# Patient Record
Sex: MALE | Race: WHITE | Employment: FULL TIME | URBAN - METROPOLITAN AREA
[De-identification: names, ages, dates, MRNs, and addresses within clinical notes are randomized per-mention and may not be internally consistent; named-entity substitution may affect disease eponyms.]

---

## 2017-04-17 ENCOUNTER — OFFICE VISIT (OUTPATIENT)
Dept: FAMILY MEDICINE CLINIC | Facility: CLINIC | Age: 42
End: 2017-04-17

## 2017-04-17 VITALS
DIASTOLIC BLOOD PRESSURE: 94 MMHG | HEART RATE: 88 BPM | HEIGHT: 70 IN | TEMPERATURE: 98 F | WEIGHT: 218 LBS | BODY MASS INDEX: 31.21 KG/M2 | RESPIRATION RATE: 18 BRPM | OXYGEN SATURATION: 97 % | SYSTOLIC BLOOD PRESSURE: 144 MMHG

## 2017-04-17 DIAGNOSIS — Z00.00 LABORATORY EXAMINATION ORDERED AS PART OF A COMPLETE PHYSICAL EXAMINATION: ICD-10-CM

## 2017-04-17 DIAGNOSIS — F32.A DEPRESSION, UNSPECIFIED DEPRESSION TYPE: ICD-10-CM

## 2017-04-17 DIAGNOSIS — L30.9 ECZEMA, UNSPECIFIED TYPE: Primary | ICD-10-CM

## 2017-04-17 DIAGNOSIS — I10 ESSENTIAL HYPERTENSION: ICD-10-CM

## 2017-04-17 DIAGNOSIS — G47.30 SLEEP APNEA, UNSPECIFIED TYPE: ICD-10-CM

## 2017-04-17 PROCEDURE — 99214 OFFICE O/P EST MOD 30 MIN: CPT | Performed by: FAMILY MEDICINE

## 2017-04-17 RX ORDER — LOSARTAN POTASSIUM 50 MG/1
50 TABLET ORAL DAILY
Qty: 30 TABLET | Refills: 1 | Status: SHIPPED | OUTPATIENT
Start: 2017-04-17 | End: 2017-06-15

## 2017-04-17 RX ORDER — CLOBETASOL PROPIONATE 0.5 MG/ML
LOTION TOPICAL
Qty: 59 G | Refills: 1 | Status: SHIPPED | OUTPATIENT
Start: 2017-04-17 | End: 2017-07-17

## 2017-04-17 RX ORDER — ALPRAZOLAM 0.25 MG/1
0.25 TABLET ORAL 2 TIMES DAILY PRN
Qty: 30 TABLET | Refills: 1 | Status: SHIPPED | OUTPATIENT
Start: 2017-04-17 | End: 2018-04-16

## 2017-04-17 RX ORDER — LOSARTAN POTASSIUM 50 MG/1
TABLET ORAL
Qty: 90 TABLET | Refills: 1 | OUTPATIENT
Start: 2017-04-17

## 2017-04-17 RX ORDER — BUPROPION HYDROCHLORIDE 150 MG/1
150 TABLET ORAL DAILY
Qty: 30 TABLET | Refills: 1 | Status: SHIPPED | OUTPATIENT
Start: 2017-04-17 | End: 2017-06-15

## 2017-04-17 NOTE — PROGRESS NOTES
HPI:    Patient ID: Vish Marc is a 42year old male. HPI Comments: Depression chronic. No suicidal thoughts. Intermittently. Not on antidepressant medication for quit some time. smoking reguarly. Does want to quit. Change in sleep.   Mild anhedon Outpatient Prescriptions:  BuPROPion HCl ER, XL, 150 MG Oral Tablet 24 Hr Take 1 tablet (150 mg total) by mouth daily. Disp: 30 tablet Rfl: 1   ALPRAZolam 0.25 MG Oral Tab Take 1 tablet (0.25 mg total) by mouth 2 (two) times daily as needed for Anxiety.  Wili Tucker physical    1. Sleep apnea, unspecified type  - Diagnostic Sleep Study-split night PAP implemented if criteria met    2. Depression, unspecified depression type  - BuPROPion HCl ER, XL, 150 MG Oral Tablet 24 Hr; Take 1 tablet (150 mg total) by mouth daily.

## 2017-04-22 ENCOUNTER — LAB ENCOUNTER (OUTPATIENT)
Dept: LAB | Age: 42
End: 2017-04-22
Attending: FAMILY MEDICINE
Payer: COMMERCIAL

## 2017-04-22 DIAGNOSIS — Z00.00 LABORATORY EXAMINATION ORDERED AS PART OF A COMPLETE PHYSICAL EXAMINATION: ICD-10-CM

## 2017-04-22 PROCEDURE — 82306 VITAMIN D 25 HYDROXY: CPT

## 2017-04-22 PROCEDURE — 80061 LIPID PANEL: CPT

## 2017-04-22 PROCEDURE — 80053 COMPREHEN METABOLIC PANEL: CPT

## 2017-04-22 PROCEDURE — 84443 ASSAY THYROID STIM HORMONE: CPT

## 2017-04-22 PROCEDURE — 36415 COLL VENOUS BLD VENIPUNCTURE: CPT

## 2017-04-22 PROCEDURE — 85025 COMPLETE CBC W/AUTO DIFF WBC: CPT

## 2017-04-22 PROCEDURE — 83721 ASSAY OF BLOOD LIPOPROTEIN: CPT

## 2017-04-28 ENCOUNTER — OFFICE VISIT (OUTPATIENT)
Dept: FAMILY MEDICINE CLINIC | Facility: CLINIC | Age: 42
End: 2017-04-28

## 2017-04-28 VITALS
HEIGHT: 70 IN | WEIGHT: 218 LBS | BODY MASS INDEX: 31.21 KG/M2 | OXYGEN SATURATION: 97 % | SYSTOLIC BLOOD PRESSURE: 118 MMHG | DIASTOLIC BLOOD PRESSURE: 78 MMHG | RESPIRATION RATE: 18 BRPM | HEART RATE: 96 BPM

## 2017-04-28 DIAGNOSIS — F10.10 ALCOHOL ABUSE: ICD-10-CM

## 2017-04-28 DIAGNOSIS — Z00.00 ANNUAL PHYSICAL EXAM: Primary | ICD-10-CM

## 2017-04-28 DIAGNOSIS — R73.01 ELEVATED FASTING GLUCOSE: ICD-10-CM

## 2017-04-28 DIAGNOSIS — R74.8 LIVER ENZYME ELEVATION: ICD-10-CM

## 2017-04-28 DIAGNOSIS — F17.200 SMOKING: ICD-10-CM

## 2017-04-28 DIAGNOSIS — E55.9 VITAMIN D DEFICIENCY: ICD-10-CM

## 2017-04-28 DIAGNOSIS — R79.89 ABNORMAL CBC: ICD-10-CM

## 2017-04-28 DIAGNOSIS — E66.09 OBESITY DUE TO EXCESS CALORIES, UNSPECIFIED OBESITY SEVERITY: ICD-10-CM

## 2017-04-28 PROCEDURE — 99396 PREV VISIT EST AGE 40-64: CPT | Performed by: FAMILY MEDICINE

## 2017-04-28 RX ORDER — ERGOCALCIFEROL 1.25 MG/1
50000 CAPSULE ORAL WEEKLY
Qty: 4 CAPSULE | Refills: 2 | Status: SHIPPED | OUTPATIENT
Start: 2017-04-28 | End: 2018-04-16 | Stop reason: ALTCHOICE

## 2017-04-28 NOTE — H&P
Michael Marc is a 36year old male who presents for a complete physical exam.   HPI:   Pt complains of smoking, alcohol, obesity, HTN. Review of blood test showing elevated liver enzymes, elevated glucose, and vit D def. No abd pain, no N/V. No F/C.   No 6.0 oz/week       12 Standard drinks or equivalent per week       Comment: social        REVIEW OF SYSTEMS:   GENERAL: feels well otherwise  SKIN: denies any unusual skin lesions  EYES:denies blurred vision or double vision  HEENT: denies nasal congestion, issues and agrees to the plan. Get blood test in 2 weeks. Diet, exercise, weight loss, quit EtOH, quit Smoking  Get lipid panel in 2-3 months. 1. Annual physical exam  Reviewed labs with pt.    2. Liver enzyme elevation  Stop etoh, loose weight.   -

## 2017-05-01 ENCOUNTER — LAB ENCOUNTER (OUTPATIENT)
Dept: LAB | Age: 42
End: 2017-05-01
Attending: FAMILY MEDICINE
Payer: COMMERCIAL

## 2017-05-01 ENCOUNTER — OFFICE VISIT (OUTPATIENT)
Dept: FAMILY MEDICINE CLINIC | Facility: CLINIC | Age: 42
End: 2017-05-01

## 2017-05-01 VITALS
DIASTOLIC BLOOD PRESSURE: 80 MMHG | HEIGHT: 70 IN | HEART RATE: 94 BPM | BODY MASS INDEX: 30.49 KG/M2 | SYSTOLIC BLOOD PRESSURE: 126 MMHG | WEIGHT: 213 LBS | RESPIRATION RATE: 16 BRPM | TEMPERATURE: 99 F

## 2017-05-01 DIAGNOSIS — R10.32 LEFT LOWER QUADRANT PAIN: ICD-10-CM

## 2017-05-01 DIAGNOSIS — R79.89 ELEVATED LIVER FUNCTION TESTS: ICD-10-CM

## 2017-05-01 DIAGNOSIS — R10.32 LEFT LOWER QUADRANT PAIN: Primary | ICD-10-CM

## 2017-05-01 DIAGNOSIS — R10.9 FLANK PAIN: ICD-10-CM

## 2017-05-01 PROCEDURE — 99213 OFFICE O/P EST LOW 20 MIN: CPT | Performed by: FAMILY MEDICINE

## 2017-05-01 PROCEDURE — 80053 COMPREHEN METABOLIC PANEL: CPT

## 2017-05-01 PROCEDURE — 87086 URINE CULTURE/COLONY COUNT: CPT | Performed by: FAMILY MEDICINE

## 2017-05-01 PROCEDURE — 36415 COLL VENOUS BLD VENIPUNCTURE: CPT

## 2017-05-01 PROCEDURE — 81003 URINALYSIS AUTO W/O SCOPE: CPT | Performed by: FAMILY MEDICINE

## 2017-05-01 PROCEDURE — 85025 COMPLETE CBC W/AUTO DIFF WBC: CPT

## 2017-05-01 NOTE — PROGRESS NOTES
This is a patient of Dr. Sarai Noguera who just had a physical last week on Friday and laboratories approximately a week before. Presents with lower back pain that seemed to radiate around into the lower abdomen.   Yesterday alternated Tylenol and Motrin to help c Relation Age of Onset   • High Cholesterol Father    • Diabetes Father    • High Blood Pressure Mother    • Diabetes Paternal Grandmother    • Diabetes Paternal Grandfather    • Diabetes Sister    • ADHD Daughter        PHYSICAL EXAM:  /80 mmHg  Puls

## 2017-05-02 RX ORDER — LEVOFLOXACIN 500 MG/1
500 TABLET, FILM COATED ORAL DAILY
Qty: 7 TABLET | Refills: 0 | Status: SHIPPED | OUTPATIENT
Start: 2017-05-02 | End: 2017-05-09

## 2017-05-04 ENCOUNTER — OFFICE VISIT (OUTPATIENT)
Dept: SLEEP CENTER | Facility: HOSPITAL | Age: 42
End: 2017-05-04
Attending: FAMILY MEDICINE
Payer: COMMERCIAL

## 2017-05-04 PROCEDURE — 95810 POLYSOM 6/> YRS 4/> PARAM: CPT

## 2017-05-31 NOTE — PROGRESS NOTES
Quick Note:    Pt will be notified by CPAP coordinator and will f/u with Dr. Oswaldo Ovalle. Please explain that the delay in interpreting this study was due to study having been assigned to another non-[facility] physician.  I interpreted the study as the  b/c

## 2017-05-31 NOTE — PROCEDURES
[address] [address],[facility] [address]       Accredited by the [facility] of [facility] (AASM)    PATIENT'S NAME:        Aditi Jack  ATTENDING PHYSICIAN:   Yanick Talley M.D.   REFERRING PHYSICIAN:   Gerry Nava MD  PATIENT ACC strong positional component with a supine AHI of 64 and a lateral AHI of 6. Patient did have oxyhemoglobin desaturations with an oxygen saturation vivek of 81%; however, he spent less than 1% of sleep time with oxygen levels below 90%.     PERIODIC LIMB MO

## 2017-06-01 ENCOUNTER — TELEPHONE (OUTPATIENT)
Dept: FAMILY MEDICINE CLINIC | Facility: CLINIC | Age: 42
End: 2017-06-01

## 2017-06-01 DIAGNOSIS — G47.30 SLEEP APNEA, UNSPECIFIED TYPE: Primary | ICD-10-CM

## 2017-06-02 NOTE — TELEPHONE ENCOUNTER
+ sleep apnea. Very positional.  Recommendation is for CPAP machine. I ordered CPAP titration study, please have him go get it. Based on titration study we will send out for CPAP machine.

## 2017-06-15 ENCOUNTER — OFFICE VISIT (OUTPATIENT)
Dept: SLEEP CENTER | Facility: HOSPITAL | Age: 42
End: 2017-06-15
Attending: FAMILY MEDICINE
Payer: COMMERCIAL

## 2017-06-15 PROCEDURE — 95811 POLYSOM 6/>YRS CPAP 4/> PARM: CPT

## 2017-06-15 RX ORDER — LOSARTAN POTASSIUM 50 MG/1
TABLET ORAL
Qty: 90 TABLET | Refills: 1 | Status: SHIPPED | OUTPATIENT
Start: 2017-06-15 | End: 2018-04-16

## 2017-06-16 RX ORDER — BUPROPION HYDROCHLORIDE 150 MG/1
TABLET ORAL
Qty: 90 TABLET | Refills: 0 | Status: SHIPPED | OUTPATIENT
Start: 2017-06-16 | End: 2018-04-16

## 2017-06-21 NOTE — PROCEDURES
[address] [address],[facility] [address]       Accredited by the [facility] of [facility] Medicine (AASM)    PATIENT'S NAME:        Isaias Beckett  ATTENDING PHYSICIAN:   Marnie Butts M.D.   REFERRING PHYSICIAN:   Johnathan Colbert MD  PATIENT ACC stage REM 24%. RESPIRATORY MEASURES:  The patient was initiated on CPAP at 5 cm of water and titrated up to a final pressure of 8 cm of water.   On this setting, patient was able to achieve prolonged periods of stage REM sleep in the lateral position; ho

## 2017-06-21 NOTE — PROGRESS NOTES
Quick Note:    Pt will be notified by CPAP coordinator and pt will f/u with referring physician  ______

## 2017-06-22 ENCOUNTER — TELEPHONE (OUTPATIENT)
Dept: FAMILY MEDICINE CLINIC | Facility: CLINIC | Age: 42
End: 2017-06-22

## 2017-06-22 DIAGNOSIS — G47.33 OBSTRUCTIVE SLEEP APNEA (ADULT) (PEDIATRIC): Primary | ICD-10-CM

## 2017-06-22 NOTE — TELEPHONE ENCOUNTER
Patient had sleep studies done, need DME for cpap machine and supplies, needs referral for this.        Outgoing/external  [facility]  Diagnosis:  G47.33  1 visit      In notes:       CPAP DEVICE    HEATED HUMIDIFIER    FULL FACE MASK

## 2017-06-27 ENCOUNTER — TELEPHONE (OUTPATIENT)
Dept: FAMILY MEDICINE CLINIC | Facility: CLINIC | Age: 42
End: 2017-06-27

## 2017-06-27 NOTE — TELEPHONE ENCOUNTER
----- Message from Niurka Keyes DO sent at 6/23/2017  3:38 PM CDT -----  Patient should be initiated on CPAP at 8 cm of water pressure with EPR level 1, humidity level 4.  During this study, patient used a ResMed AirFit P-10 size small mask.   2.      Tasha

## 2017-08-18 ENCOUNTER — APPOINTMENT (OUTPATIENT)
Dept: LAB | Age: 42
End: 2017-08-18
Attending: DERMATOLOGY
Payer: COMMERCIAL

## 2017-08-18 DIAGNOSIS — D23.9 DERMATOFIBROMA: ICD-10-CM

## 2017-08-18 DIAGNOSIS — R52 TENDERNESS: ICD-10-CM

## 2017-08-18 PROCEDURE — 88305 TISSUE EXAM BY PATHOLOGIST: CPT

## 2018-03-08 RX ORDER — ACYCLOVIR 400 MG/1
400 TABLET ORAL
Qty: 50 TABLET | Refills: 0 | OUTPATIENT
Start: 2018-03-08

## 2018-03-08 NOTE — TELEPHONE ENCOUNTER
I saw him once for a rash on abdomen. Sees Dr. Jac Soriano. May need more info on why this is being refilled.

## 2018-04-02 ENCOUNTER — TELEPHONE (OUTPATIENT)
Dept: FAMILY MEDICINE CLINIC | Facility: CLINIC | Age: 43
End: 2018-04-02

## 2018-04-02 DIAGNOSIS — G47.33 OSA (OBSTRUCTIVE SLEEP APNEA): Primary | ICD-10-CM

## 2018-04-06 ENCOUNTER — LAB ENCOUNTER (OUTPATIENT)
Dept: LAB | Age: 43
End: 2018-04-06
Attending: DERMATOLOGY
Payer: COMMERCIAL

## 2018-04-06 DIAGNOSIS — C44.619 BCC (BASAL CELL CARCINOMA), ARM, LEFT: ICD-10-CM

## 2018-04-06 PROCEDURE — 88305 TISSUE EXAM BY PATHOLOGIST: CPT

## 2018-04-09 RX ORDER — ACYCLOVIR 400 MG/1
400 TABLET ORAL
Refills: 0 | OUTPATIENT
Start: 2018-04-09

## 2018-04-16 ENCOUNTER — OFFICE VISIT (OUTPATIENT)
Dept: FAMILY MEDICINE CLINIC | Facility: CLINIC | Age: 43
End: 2018-04-16

## 2018-04-16 VITALS
SYSTOLIC BLOOD PRESSURE: 132 MMHG | HEART RATE: 76 BPM | BODY MASS INDEX: 30.78 KG/M2 | DIASTOLIC BLOOD PRESSURE: 84 MMHG | OXYGEN SATURATION: 98 % | WEIGHT: 215 LBS | RESPIRATION RATE: 18 BRPM | TEMPERATURE: 98 F | HEIGHT: 70 IN

## 2018-04-16 DIAGNOSIS — F41.9 ANXIETY: ICD-10-CM

## 2018-04-16 DIAGNOSIS — Z00.00 LABORATORY EXAMINATION ORDERED AS PART OF A COMPLETE PHYSICAL EXAMINATION: ICD-10-CM

## 2018-04-16 DIAGNOSIS — J41.0 SMOKERS' COUGH (HCC): ICD-10-CM

## 2018-04-16 DIAGNOSIS — A60.01 HERPES SIMPLEX INFECTION OF PENIS: ICD-10-CM

## 2018-04-16 DIAGNOSIS — Z71.6 ENCOUNTER FOR SMOKING CESSATION COUNSELING: ICD-10-CM

## 2018-04-16 DIAGNOSIS — I10 ESSENTIAL HYPERTENSION: Primary | ICD-10-CM

## 2018-04-16 PROCEDURE — 99214 OFFICE O/P EST MOD 30 MIN: CPT | Performed by: FAMILY MEDICINE

## 2018-04-16 PROCEDURE — 99406 BEHAV CHNG SMOKING 3-10 MIN: CPT | Performed by: FAMILY MEDICINE

## 2018-04-16 RX ORDER — ALPRAZOLAM 0.25 MG/1
0.25 TABLET ORAL 2 TIMES DAILY PRN
Qty: 30 TABLET | Refills: 1 | Status: SHIPPED | OUTPATIENT
Start: 2018-04-16 | End: 2020-07-24

## 2018-04-16 RX ORDER — VARENICLINE TARTRATE 1 MG/1
1 TABLET, FILM COATED ORAL 2 TIMES DAILY
Qty: 180 TABLET | Refills: 0 | Status: SHIPPED | OUTPATIENT
Start: 2018-04-16 | End: 2018-11-12

## 2018-04-16 RX ORDER — VALACYCLOVIR HYDROCHLORIDE 1 G/1
1 TABLET, FILM COATED ORAL DAILY
Qty: 90 TABLET | Refills: 3 | Status: SHIPPED | OUTPATIENT
Start: 2018-04-16

## 2018-04-16 RX ORDER — VARENICLINE TARTRATE 0.5 (11)-1
KIT ORAL
Refills: 0 | COMMUNITY
Start: 2018-03-28 | End: 2020-06-15

## 2018-04-16 RX ORDER — BUPROPION HYDROCHLORIDE 150 MG/1
TABLET ORAL
Qty: 90 TABLET | Refills: 1 | Status: ON HOLD | OUTPATIENT
Start: 2018-04-16 | End: 2020-12-20

## 2018-04-16 RX ORDER — LOSARTAN POTASSIUM 50 MG/1
TABLET ORAL
Qty: 90 TABLET | Refills: 1 | Status: SHIPPED | OUTPATIENT
Start: 2018-04-16 | End: 2020-06-15

## 2018-04-16 NOTE — PROGRESS NOTES
HPI:    Patient ID: Gabriela Marc is a 36year old male. Chronic smoker. Has decreased and wants to quit. Previously quit but only for a short time. Wants to continue chantix. Thinks that wellbutrin helped also. + smoker's cough.   States cough stoppe as needed for Anxiety. Disp: 30 tablet Rfl: 1   ValACYclovir HCl 1 G Oral Tab Take 1 tablet (1,000 mg total) by mouth daily. Disp: 90 tablet Rfl: 3   Clobetasol Propionate 0.05 % External Ointment Apply 1 Application topically 2 (two) times daily.  Disp: 60 ALPRAZolam 0.25 MG Oral Tab; Take 1 tablet (0.25 mg total) by mouth 2 (two) times daily as needed for Anxiety. Dispense: 30 tablet; Refill: 1    4. Smokers' cough ([address]  Quit smoking    5.  Encounter for smoking cessation counseling  Discussed risk of smok

## 2018-05-03 NOTE — TELEPHONE ENCOUNTER
Entered Referral.  Prepared fax for YP to review/sign when back in office with attached Sleep Study/Titration Study notes with Referral printed.

## 2018-05-14 ENCOUNTER — LAB ENCOUNTER (OUTPATIENT)
Dept: LAB | Age: 43
End: 2018-05-14
Attending: FAMILY MEDICINE
Payer: COMMERCIAL

## 2018-05-14 DIAGNOSIS — I10 ESSENTIAL HYPERTENSION: ICD-10-CM

## 2018-05-14 DIAGNOSIS — Z00.00 LABORATORY EXAMINATION ORDERED AS PART OF A COMPLETE PHYSICAL EXAMINATION: ICD-10-CM

## 2018-05-14 PROCEDURE — 36415 COLL VENOUS BLD VENIPUNCTURE: CPT

## 2018-05-14 PROCEDURE — 80061 LIPID PANEL: CPT

## 2018-05-14 PROCEDURE — 80053 COMPREHEN METABOLIC PANEL: CPT

## 2018-05-14 PROCEDURE — 82306 VITAMIN D 25 HYDROXY: CPT

## 2018-05-14 PROCEDURE — 85025 COMPLETE CBC W/AUTO DIFF WBC: CPT

## 2018-05-21 ENCOUNTER — OFFICE VISIT (OUTPATIENT)
Dept: FAMILY MEDICINE CLINIC | Facility: CLINIC | Age: 43
End: 2018-05-21

## 2018-05-21 VITALS
DIASTOLIC BLOOD PRESSURE: 86 MMHG | HEIGHT: 70 IN | BODY MASS INDEX: 30.06 KG/M2 | RESPIRATION RATE: 18 BRPM | TEMPERATURE: 98 F | SYSTOLIC BLOOD PRESSURE: 118 MMHG | HEART RATE: 75 BPM | WEIGHT: 210 LBS | OXYGEN SATURATION: 96 %

## 2018-05-21 DIAGNOSIS — Z00.00 ANNUAL PHYSICAL EXAM: Primary | ICD-10-CM

## 2018-05-21 DIAGNOSIS — D58.2 ABNORMAL HEMOGLOBIN (HCC): ICD-10-CM

## 2018-05-21 DIAGNOSIS — E78.2 MIXED HYPERLIPIDEMIA: ICD-10-CM

## 2018-05-21 DIAGNOSIS — E01.0 THYROMEGALY: ICD-10-CM

## 2018-05-21 DIAGNOSIS — I10 ESSENTIAL HYPERTENSION: ICD-10-CM

## 2018-05-21 PROCEDURE — 99396 PREV VISIT EST AGE 40-64: CPT | Performed by: FAMILY MEDICINE

## 2018-05-21 PROCEDURE — 99212 OFFICE O/P EST SF 10 MIN: CPT | Performed by: FAMILY MEDICINE

## 2018-05-21 NOTE — H&P
Walker Marc is a 39year old male who presents for a complete physical exam.   HPI:   Pt's lab results show abnormal hemoglobin levels. States he has a varied diet. Denied CP/SOB/HAs. Not anemic. Hyperlipidemia   This is a chronic problem.  The chioma 1 TABLET(50 MG) BY MOUTH DAILY Disp: 90 tablet Rfl: 1   ALPRAZolam 0.25 MG Oral Tab Take 1 tablet (0.25 mg total) by mouth 2 (two) times daily as needed for Anxiety.  Disp: 30 tablet Rfl: 1   ValACYclovir HCl 1 G Oral Tab Take 1 tablet (1,000 mg total) by m palpitations  GI: denies abdominal pain,denies heartburn, denies chronic diarrhea or constipation  : denies nocturia or changes in stream, denies erectile dysfunction  MS: denies back pain, arthralgias or myalgias  NEURO: denies headaches or dizziness  P future if loosing weight. 5. Thyromegaly  - US THYROID (PEM=[id number]);  Future        Orders Placed This Encounter      Lipid Panel      Vitamin H15 [E]      Folic Acid Serum [E]      CBC W Differential W Platelet [E]

## 2018-06-15 ENCOUNTER — TELEPHONE (OUTPATIENT)
Dept: FAMILY MEDICINE CLINIC | Facility: CLINIC | Age: 43
End: 2018-06-15

## 2018-06-15 ENCOUNTER — HOSPITAL ENCOUNTER (OUTPATIENT)
Dept: ULTRASOUND IMAGING | Age: 43
Discharge: HOME | End: 2018-06-15
Attending: FAMILY MEDICINE
Payer: COMMERCIAL

## 2018-06-15 DIAGNOSIS — E01.0 THYROMEGALY: ICD-10-CM

## 2018-06-15 PROCEDURE — 76536 US EXAM OF HEAD AND NECK: CPT | Performed by: FAMILY MEDICINE

## 2018-06-19 ENCOUNTER — TELEPHONE (OUTPATIENT)
Dept: FAMILY MEDICINE CLINIC | Facility: CLINIC | Age: 43
End: 2018-06-19

## 2018-10-10 ENCOUNTER — TELEPHONE (OUTPATIENT)
Dept: FAMILY MEDICINE CLINIC | Facility: CLINIC | Age: 43
End: 2018-10-10

## 2018-10-10 DIAGNOSIS — G47.33 OSA ON CPAP: Primary | ICD-10-CM

## 2018-10-10 DIAGNOSIS — Z99.89 OSA ON CPAP: Primary | ICD-10-CM

## 2018-10-31 ENCOUNTER — TELEPHONE (OUTPATIENT)
Dept: FAMILY MEDICINE CLINIC | Facility: CLINIC | Age: 43
End: 2018-10-31

## 2018-10-31 DIAGNOSIS — L40.0 PSORIASIS VULGARIS: Primary | ICD-10-CM

## 2018-10-31 NOTE — TELEPHONE ENCOUNTER
Fax received from [facility] requesting a referral for patient who has appointment in their office on Friday 11/2. Chart note faxed.     Placed in [facility] 5675 triage bin

## 2019-01-08 ENCOUNTER — TELEPHONE (OUTPATIENT)
Dept: FAMILY MEDICINE CLINIC | Facility: CLINIC | Age: 44
End: 2019-01-08

## 2019-01-08 DIAGNOSIS — Z99.89 OSA ON CPAP: Primary | ICD-10-CM

## 2019-01-08 DIAGNOSIS — G47.33 OSA ON CPAP: Primary | ICD-10-CM

## 2019-01-25 PROCEDURE — 88305 TISSUE EXAM BY PATHOLOGIST: CPT

## 2019-01-26 ENCOUNTER — LAB ENCOUNTER (OUTPATIENT)
Dept: LAB | Age: 44
End: 2019-01-26
Attending: DERMATOLOGY
Payer: COMMERCIAL

## 2019-01-26 DIAGNOSIS — C44.629 SCC (SQUAMOUS CELL CARCINOMA), ARM, LEFT: ICD-10-CM

## 2019-02-22 ENCOUNTER — LAB ENCOUNTER (OUTPATIENT)
Dept: LAB | Age: 44
End: 2019-02-22
Attending: FAMILY MEDICINE
Payer: COMMERCIAL

## 2019-02-22 DIAGNOSIS — D58.2 ABNORMAL HEMOGLOBIN (HCC): ICD-10-CM

## 2019-02-22 DIAGNOSIS — E78.2 MIXED HYPERLIPIDEMIA: ICD-10-CM

## 2019-02-22 LAB
BASOPHILS # BLD AUTO: 0.06 X10(3) UL (ref 0–0.2)
BASOPHILS NFR BLD AUTO: 0.9 %
CHOLEST SMN-MCNC: 282 MG/DL (ref ?–200)
DEPRECATED RDW RBC AUTO: 46.8 FL (ref 35.1–46.3)
EOSINOPHIL # BLD AUTO: 0.51 X10(3) UL (ref 0–0.7)
EOSINOPHIL NFR BLD AUTO: 7.2 %
ERYTHROCYTE [DISTWIDTH] IN BLOOD BY AUTOMATED COUNT: 12.1 % (ref 11–15)
FOLATE SERPL-MCNC: 7.5 NG/ML (ref 8.7–?)
HCT VFR BLD AUTO: 45.4 % (ref 39–53)
HDLC SERPL-MCNC: 45 MG/DL (ref 40–59)
HGB BLD-MCNC: 15.1 G/DL (ref 13–17.5)
IMM GRANULOCYTES # BLD AUTO: 0.03 X10(3) UL (ref 0–1)
IMM GRANULOCYTES NFR BLD: 0.4 %
LDLC SERPL CALC-MCNC: 172 MG/DL (ref ?–100)
LYMPHOCYTES # BLD AUTO: 1.9 X10(3) UL (ref 1–4)
LYMPHOCYTES NFR BLD AUTO: 27 %
MCH RBC QN AUTO: 34.5 PG (ref 26–34)
MCHC RBC AUTO-ENTMCNC: 33.3 G/DL (ref 31–37)
MCV RBC AUTO: 103.7 FL (ref 80–100)
MONOCYTES # BLD AUTO: 0.69 X10(3) UL (ref 0.1–1)
MONOCYTES NFR BLD AUTO: 9.8 %
NEUTROPHILS # BLD AUTO: 3.86 X10 (3) UL (ref 1.5–7.7)
NEUTROPHILS # BLD AUTO: 3.86 X10(3) UL (ref 1.5–7.7)
NEUTROPHILS NFR BLD AUTO: 54.7 %
NONHDLC SERPL-MCNC: 237 MG/DL (ref ?–130)
PLATELET # BLD AUTO: 193 10(3)UL (ref 150–450)
RBC # BLD AUTO: 4.38 X10(6)UL (ref 4.3–5.7)
TRIGL SERPL-MCNC: 325 MG/DL (ref 30–149)
VIT B12 SERPL-MCNC: 378 PG/ML (ref 193–986)
VLDLC SERPL CALC-MCNC: 65 MG/DL (ref 0–30)
WBC # BLD AUTO: 7.1 X10(3) UL (ref 4–11)

## 2019-02-22 PROCEDURE — 85025 COMPLETE CBC W/AUTO DIFF WBC: CPT

## 2019-02-22 PROCEDURE — 82607 VITAMIN B-12: CPT

## 2019-02-22 PROCEDURE — 82746 ASSAY OF FOLIC ACID SERUM: CPT

## 2019-02-22 PROCEDURE — 80061 LIPID PANEL: CPT

## 2019-02-22 PROCEDURE — 36415 COLL VENOUS BLD VENIPUNCTURE: CPT

## 2019-04-16 ENCOUNTER — TELEPHONE (OUTPATIENT)
Dept: FAMILY MEDICINE CLINIC | Facility: CLINIC | Age: 44
End: 2019-04-16

## 2019-04-16 DIAGNOSIS — G47.33 OBSTRUCTIVE SLEEP APNEA (ADULT) (PEDIATRIC): Primary | ICD-10-CM

## 2019-08-19 ENCOUNTER — TELEPHONE (OUTPATIENT)
Dept: FAMILY MEDICINE CLINIC | Facility: CLINIC | Age: 44
End: 2019-08-19

## 2019-08-19 DIAGNOSIS — G47.33 OSA (OBSTRUCTIVE SLEEP APNEA): Primary | ICD-10-CM

## 2020-06-15 ENCOUNTER — OFFICE VISIT (OUTPATIENT)
Dept: FAMILY MEDICINE CLINIC | Facility: CLINIC | Age: 45
End: 2020-06-15
Payer: COMMERCIAL

## 2020-06-15 VITALS
TEMPERATURE: 98 F | SYSTOLIC BLOOD PRESSURE: 136 MMHG | HEIGHT: 70 IN | RESPIRATION RATE: 16 BRPM | BODY MASS INDEX: 29.06 KG/M2 | WEIGHT: 203 LBS | DIASTOLIC BLOOD PRESSURE: 74 MMHG | HEART RATE: 73 BPM | OXYGEN SATURATION: 98 %

## 2020-06-15 DIAGNOSIS — E53.8 FOLIC ACID DEFICIENCY: ICD-10-CM

## 2020-06-15 DIAGNOSIS — E55.9 VITAMIN D DEFICIENCY: ICD-10-CM

## 2020-06-15 DIAGNOSIS — N48.89 PENILE PAIN: ICD-10-CM

## 2020-06-15 DIAGNOSIS — L30.9 DERMATITIS: ICD-10-CM

## 2020-06-15 DIAGNOSIS — Z71.6 ENCOUNTER FOR SMOKING CESSATION COUNSELING: ICD-10-CM

## 2020-06-15 DIAGNOSIS — I10 ESSENTIAL HYPERTENSION: ICD-10-CM

## 2020-06-15 DIAGNOSIS — N52.9 ERECTILE DYSFUNCTION, UNSPECIFIED ERECTILE DYSFUNCTION TYPE: ICD-10-CM

## 2020-06-15 DIAGNOSIS — F32.A ANXIETY AND DEPRESSION: ICD-10-CM

## 2020-06-15 DIAGNOSIS — Z00.00 ANNUAL PHYSICAL EXAM: Primary | ICD-10-CM

## 2020-06-15 DIAGNOSIS — F41.9 ANXIETY AND DEPRESSION: ICD-10-CM

## 2020-06-15 PROCEDURE — 99396 PREV VISIT EST AGE 40-64: CPT | Performed by: FAMILY MEDICINE

## 2020-06-15 PROCEDURE — 99212 OFFICE O/P EST SF 10 MIN: CPT | Performed by: FAMILY MEDICINE

## 2020-06-15 RX ORDER — ESCITALOPRAM OXALATE 10 MG/1
TABLET ORAL
Qty: 90 TABLET | Refills: 1 | Status: SHIPPED | OUTPATIENT
Start: 2020-06-15 | End: 2020-12-07

## 2020-06-15 RX ORDER — VARENICLINE TARTRATE 0.5 (11)-1
KIT ORAL
Qty: 1 TABLET | Refills: 0 | Status: SHIPPED | OUTPATIENT
Start: 2020-06-15 | End: 2020-12-30

## 2020-06-15 RX ORDER — LOSARTAN POTASSIUM 25 MG/1
25 TABLET ORAL DAILY
Qty: 90 TABLET | Refills: 1 | Status: SHIPPED | OUTPATIENT
Start: 2020-06-15 | End: 2020-12-07

## 2020-06-15 NOTE — H&P
Aldo Marc is a 54year old male who presents for a complete physical exam.   HPI:   Pt complains of HTN, anxiety, smoking addiction, alcohol addiction, ED. Previous blood test showing R79 def, folic acid def, vit D def. HTN, chronic, controlled.   Sivakumar Roper Halobetasol Propionate 0.05 % External Ointment Apply 1 g topically 2 (two) times daily. Apply a thin layer to affected area(s).  (Patient not taking: Reported on 6/15/2020 ) 50 g 3      Past Medical History:   Diagnosis Date   • Anxiety    • Genital HSV bleeding  ENDOCRINE:denies frequent thirst or urination, denies unintentional weight gain/loss  ALL/ASTHMA: denies hx of allergy or asthma    EXAM:   /74   Pulse 73   Temp 98 °F (36.7 °C) (Skin)   Resp 16   Ht 70\"   Wt 203 lb (92.1 kg)   SpO2 98% MG Oral Tab; 1/2 tab daily x 1 week, then 1 tab daily  Dispense: 90 tablet; Refill: 1    7. Dermatitis  Use steroid cream.    8. Penile pain  - UROLOGY - INTERNAL    9.  Erectile dysfunction, unspecified erectile dysfunction type  - UROLOGY - INTERNAL

## 2020-07-24 DIAGNOSIS — F41.9 ANXIETY: ICD-10-CM

## 2020-07-24 RX ORDER — ALPRAZOLAM 0.25 MG/1
0.25 TABLET ORAL 2 TIMES DAILY PRN
Qty: 30 TABLET | Refills: 1 | Status: SHIPPED | OUTPATIENT
Start: 2020-07-24 | End: 2020-12-30

## 2020-09-17 ENCOUNTER — TELEPHONE (OUTPATIENT)
Dept: ADMINISTRATIVE | Age: 45
End: 2020-09-17

## 2020-09-17 DIAGNOSIS — L40.0 PSORIASIS VULGARIS: Primary | ICD-10-CM

## 2020-09-17 NOTE — TELEPHONE ENCOUNTER
.Reason for the order/referral: WANTS TO SEE NEW DERMATOLOGIST   PCP:  Lino Camilo, DO  Refer to Provider (first and last name):DR Alireza Ventura     Specialty: DERMATOLOGY   Patient Insurance: Payor: Donte Simpson / Plan: [facility] Jhoan York / Enrique Bernard

## 2020-09-25 ENCOUNTER — OFFICE VISIT (OUTPATIENT)
Dept: SURGERY | Facility: CLINIC | Age: 45
End: 2020-09-25
Payer: COMMERCIAL

## 2020-09-25 VITALS — DIASTOLIC BLOOD PRESSURE: 90 MMHG | SYSTOLIC BLOOD PRESSURE: 155 MMHG | HEART RATE: 70 BPM

## 2020-09-25 DIAGNOSIS — N48.89 PENILE CHORDEE: ICD-10-CM

## 2020-09-25 DIAGNOSIS — R82.90 URINE FINDING: Primary | ICD-10-CM

## 2020-09-25 DIAGNOSIS — N52.9 ERECTILE DYSFUNCTION, UNSPECIFIED ERECTILE DYSFUNCTION TYPE: ICD-10-CM

## 2020-09-25 LAB
APPEARANCE: CLEAR
MULTISTIX LOT#: 1044 NUMERIC
PH, URINE: 6 (ref 4.5–8)
SPECIFIC GRAVITY: 1.02 (ref 1–1.03)
URINE-COLOR: YELLOW
UROBILINOGEN,SEMI-QN: 0.2 MG/DL (ref 0–1.9)

## 2020-09-25 PROCEDURE — 99203 OFFICE O/P NEW LOW 30 MIN: CPT | Performed by: UROLOGY

## 2020-09-25 PROCEDURE — 3077F SYST BP >= 140 MM HG: CPT | Performed by: UROLOGY

## 2020-09-25 PROCEDURE — 3080F DIAST BP >= 90 MM HG: CPT | Performed by: UROLOGY

## 2020-09-25 PROCEDURE — 81003 URINALYSIS AUTO W/O SCOPE: CPT | Performed by: UROLOGY

## 2020-09-25 RX ORDER — TADALAFIL 20 MG/1
20 TABLET ORAL
Qty: 30 TABLET | Refills: 5 | Status: SHIPPED | OUTPATIENT
Start: 2020-09-25

## 2020-09-25 NOTE — PROGRESS NOTES
Rooming Clinician:     Michael Hendricks Monico is a 54year old male.   Patient presents with:  Consult: pretty/o  Josiah  Miscellaneous Urology:  Chief Complaint: Patient presents with:  Consult: c/o  Josiah  Patient comes to the office  Date of Onset: about a year   P Ointment Apply 1 g topically 2 (two) times daily. Apply a thin layer to affected area(s). 50 Tube 5   • Halobetasol Propionate 0.05 % External Ointment Apply 1 g topically 2 (two) times daily. Apply a thin layer to affected area(s).  50 g 5   • Clobetasol P HPI  NEURO: no sensory or motor complaint    EXAM:     /90 (BP Location: Right arm, Patient Position: Sitting, Cuff Size: large)   Pulse 70   GENERAL: well developed, well nourished,in no apparent distress  SKIN: no rashes,no suspicious lesions  HEEN as Levitra, Cialis, Viagra, as well as generics including the mechanism of action as well as risks and possible complications.   I discussed the used to testosterone supplements when appropriate as well as other forms of treatment for erectile dysfunction w at this time.   Robert Loan is a synthetic collagenase which is injected into the scar tissue in the office over several sessions which softens the scar and reduces the curvature but will unlikely straighten the penis completely and has some complications such

## 2020-09-25 NOTE — PATIENT INSTRUCTIONS
Oral Medicines for Erectile Dysfunction  You can use prescription oral medicine for erectile dysfunction (ED). They often work well. But, like all medicines, they can have side effects.  Also, you can’t use them if you have certain health problems or take Risks of oral ED medicines  · Taking ED medicines with medicines that contain nitrates can cause your blood pressure to drop to a dangerous level. . Nitrates include nitroglycerin (a drug for angina or chest pain).  They are also in “poppers,” an inhaled re TADALAFIL (sloan DA la jair) is used to treat erection problems in men. It is also used for enlargement of the prostate gland in men, a condition called benign prostatic hyperplasia or BPH. This medicine improves urine flow and reduces BPH symptoms.  This medi Do not take this medicine with any of the following medications:  · nitrates like amyl nitrite, isosorbide dinitrate, isosorbide mononitrate, nitroglycerin  · other medicines for erectile dysfunction like avanafil, sildenafil, vardenafil  · other tadalafil · liver disease  · stroke  · an unusual or allergic reaction to tadalafil, other medicines, foods, dyes, or preservatives  · pregnant or trying to get pregnant  · breast-feeding  What should I watch for while using this medicine?   If you notice any changes

## 2020-10-06 ENCOUNTER — TELEPHONE (OUTPATIENT)
Dept: SURGERY | Facility: CLINIC | Age: 45
End: 2020-10-06

## 2020-10-06 NOTE — TELEPHONE ENCOUNTER
Per pt, insurance has sent a form to be filled with diagnoses for Tadalafil 20 MG Oral Tab.  Wanting to know if it has been received, please advise

## 2020-10-08 NOTE — TELEPHONE ENCOUNTER
Left detailed message on identified VM that we did not recieive anything from his insurance. I called his pharmacy and was told that Tadalafil does not require a prior auth but it is very expensive. I left info on getting a coupon from [employer].   Will call

## 2020-10-23 ENCOUNTER — TELEPHONE (OUTPATIENT)
Dept: SURGERY | Facility: CLINIC | Age: 45
End: 2020-10-23

## 2020-10-23 NOTE — TELEPHONE ENCOUNTER
Pt requesting the rx. Tadalafil be sent to the [facility]/[place], [address], [place]. Rx. Is cheaper.    Call pt

## 2020-10-23 NOTE — TELEPHONE ENCOUNTER
RN called in regards to his request, \"Requesting the rx. Tadalafil be sent to the [facility]/[place], [address], [place]. Rx. Is cheaper. \"     and spoke to patient.  Requested to send his Tadalafil at [address] [facility] at [address] [place] [address]

## 2020-11-07 ENCOUNTER — LAB ENCOUNTER (OUTPATIENT)
Dept: LAB | Age: 45
End: 2020-11-07
Attending: FAMILY MEDICINE
Payer: COMMERCIAL

## 2020-11-07 DIAGNOSIS — R73.09 ELEVATED GLUCOSE: ICD-10-CM

## 2020-11-07 DIAGNOSIS — N52.9 ERECTILE DYSFUNCTION, UNSPECIFIED ERECTILE DYSFUNCTION TYPE: ICD-10-CM

## 2020-11-07 DIAGNOSIS — E53.8 FOLIC ACID DEFICIENCY: ICD-10-CM

## 2020-11-07 DIAGNOSIS — Z00.00 ANNUAL PHYSICAL EXAM: ICD-10-CM

## 2020-11-07 DIAGNOSIS — E55.9 VITAMIN D DEFICIENCY: ICD-10-CM

## 2020-11-07 PROCEDURE — 84403 ASSAY OF TOTAL TESTOSTERONE: CPT

## 2020-11-07 PROCEDURE — 85025 COMPLETE CBC W/AUTO DIFF WBC: CPT

## 2020-11-07 PROCEDURE — 84443 ASSAY THYROID STIM HORMONE: CPT

## 2020-11-07 PROCEDURE — 83036 HEMOGLOBIN GLYCOSYLATED A1C: CPT

## 2020-11-07 PROCEDURE — 80061 LIPID PANEL: CPT

## 2020-11-07 PROCEDURE — 82306 VITAMIN D 25 HYDROXY: CPT

## 2020-11-07 PROCEDURE — 82746 ASSAY OF FOLIC ACID SERUM: CPT

## 2020-11-07 PROCEDURE — 36415 COLL VENOUS BLD VENIPUNCTURE: CPT

## 2020-11-07 PROCEDURE — 82607 VITAMIN B-12: CPT

## 2020-11-07 PROCEDURE — 84402 ASSAY OF FREE TESTOSTERONE: CPT

## 2020-11-07 PROCEDURE — 80053 COMPREHEN METABOLIC PANEL: CPT

## 2020-11-16 NOTE — PROGRESS NOTES
Your recent testosterone is normal.  Recommend follow up in the office as directed.     Sincerely,  Eze Solitario MD

## 2020-12-05 DIAGNOSIS — F41.9 ANXIETY AND DEPRESSION: ICD-10-CM

## 2020-12-05 DIAGNOSIS — I10 ESSENTIAL HYPERTENSION: ICD-10-CM

## 2020-12-05 DIAGNOSIS — F32.A ANXIETY AND DEPRESSION: ICD-10-CM

## 2020-12-07 RX ORDER — LOSARTAN POTASSIUM 25 MG/1
TABLET ORAL
Qty: 90 TABLET | Refills: 1 | Status: ON HOLD | OUTPATIENT
Start: 2020-12-07 | End: 2020-12-21

## 2020-12-07 RX ORDER — ESCITALOPRAM OXALATE 10 MG/1
TABLET ORAL
Qty: 90 TABLET | Refills: 1 | Status: ON HOLD | OUTPATIENT
Start: 2020-12-07 | End: 2020-12-21

## 2020-12-07 NOTE — TELEPHONE ENCOUNTER
Rx Request  escitalopram 10 MG Oral Tab    Losartan Potassium 25 MG Oral Tab    Disp:    90                R: 1    Last Refilled: 06/15/2020    Last Visit: 06/15/2020

## 2020-12-20 ENCOUNTER — APPOINTMENT (OUTPATIENT)
Dept: GENERAL RADIOLOGY | Age: 45
End: 2020-12-20
Attending: EMERGENCY MEDICINE
Payer: COMMERCIAL

## 2020-12-20 ENCOUNTER — HOSPITAL ENCOUNTER (OUTPATIENT)
Facility: HOSPITAL | Age: 45
Setting detail: OBSERVATION
Discharge: HOME | End: 2020-12-21
Attending: EMERGENCY MEDICINE | Admitting: HOSPITALIST
Payer: COMMERCIAL

## 2020-12-20 DIAGNOSIS — R07.9 CHEST PAIN OF UNCERTAIN ETIOLOGY: Primary | ICD-10-CM

## 2020-12-20 PROCEDURE — 71045 X-RAY EXAM CHEST 1 VIEW: CPT | Performed by: EMERGENCY MEDICINE

## 2020-12-20 PROCEDURE — 99220 INITIAL OBSERVATION CARE,LEVL III: CPT | Performed by: INTERNAL MEDICINE

## 2020-12-20 RX ORDER — HEPARIN SODIUM 5000 [USP'U]/ML
5000 INJECTION, SOLUTION INTRAVENOUS; SUBCUTANEOUS EVERY 8 HOURS SCHEDULED
Status: DISCONTINUED | OUTPATIENT
Start: 2020-12-20 | End: 2020-12-21

## 2020-12-20 RX ORDER — ALPRAZOLAM 0.25 MG/1
0.25 TABLET ORAL 2 TIMES DAILY PRN
Status: DISCONTINUED | OUTPATIENT
Start: 2020-12-20 | End: 2020-12-21

## 2020-12-20 RX ORDER — ONDANSETRON 2 MG/ML
4 INJECTION INTRAMUSCULAR; INTRAVENOUS EVERY 6 HOURS PRN
Status: DISCONTINUED | OUTPATIENT
Start: 2020-12-20 | End: 2020-12-21

## 2020-12-20 RX ORDER — NICOTINE 21 MG/24HR
1 PATCH, TRANSDERMAL 24 HOURS TRANSDERMAL DAILY
Status: DISCONTINUED | OUTPATIENT
Start: 2020-12-20 | End: 2020-12-21

## 2020-12-20 RX ORDER — BUPROPION HYDROCHLORIDE 150 MG/1
150 TABLET ORAL DAILY
Status: DISCONTINUED | OUTPATIENT
Start: 2020-12-20 | End: 2020-12-20

## 2020-12-20 RX ORDER — NITROGLYCERIN 0.4 MG/1
0.4 TABLET SUBLINGUAL EVERY 5 MIN PRN
Status: DISCONTINUED | OUTPATIENT
Start: 2020-12-20 | End: 2020-12-21

## 2020-12-20 RX ORDER — ASPIRIN 325 MG
325 TABLET ORAL DAILY
Status: DISCONTINUED | OUTPATIENT
Start: 2020-12-20 | End: 2020-12-21

## 2020-12-20 RX ORDER — ASPIRIN 81 MG/1
324 TABLET, CHEWABLE ORAL ONCE
Status: COMPLETED | OUTPATIENT
Start: 2020-12-20 | End: 2020-12-20

## 2020-12-20 RX ORDER — HYDRALAZINE HYDROCHLORIDE 20 MG/ML
10 INJECTION INTRAMUSCULAR; INTRAVENOUS EVERY 4 HOURS PRN
Status: DISCONTINUED | OUTPATIENT
Start: 2020-12-20 | End: 2020-12-21

## 2020-12-20 RX ORDER — LOSARTAN POTASSIUM 25 MG/1
25 TABLET ORAL DAILY
Status: DISCONTINUED | OUTPATIENT
Start: 2020-12-20 | End: 2020-12-21

## 2020-12-20 RX ORDER — ACETAMINOPHEN 325 MG/1
650 TABLET ORAL EVERY 6 HOURS PRN
Status: DISCONTINUED | OUTPATIENT
Start: 2020-12-20 | End: 2020-12-21

## 2020-12-20 NOTE — ED INITIAL ASSESSMENT (HPI)
PT STATES HE HAS BEEN FEELING TIGHTNESS IN HIS CHEST FOR THE LAST COUPLE DAYS. PT IS FEELING JITTERY, LIGHTHEADED.

## 2020-12-20 NOTE — PLAN OF CARE
Pt arrived from  ER about 1700. Pt reports he is free of CP at this time. BP elevated but pt states he is somewhat anxious over the recent CP incidents. SR on tele, 70's-80's. Hospitalist here to see pt. Oriented to room and surroundings.  RT consult

## 2020-12-20 NOTE — H&P
[facility]  History and Physical     Randymariano Mulligan Monico Patient Status:  Emergency    1975 MRN [id number]   Location [facility] Attending Deyanira Brown MD   Hosp Day # 0 PCP Victorina Bartholomew, DO     Chief Com DAILY, Disp: 90 tablet, Rfl: 1    •  ALPRAZolam 0.25 MG Oral Tab, Take 1 tablet (0.25 mg total) by mouth 2 (two) times daily as needed for Anxiety. , Disp: 30 tablet, Rfl: 1    •  Halobetasol Propionate 0.05 % External Ointment, Apply 1 g topically 2 (two) HPI.    Physical Exam:    /88   Pulse 70   Temp 97.9 °F (36.6 °C) (Temporal)   Resp 16   Ht 177.8 cm (5' 10\")   Wt 205 lb (93 kg)   SpO2 97%   BMI 29.41 kg/m²   General: No acute distress. Alert and oriented x 3. HEENT: Normocephalic atraumatic.  Addison Gut Consuelo Hameed MD  12/20/2020

## 2020-12-20 NOTE — ED PROVIDER NOTES
Patient Seen in: [facility] 8ne-a      History   Patient presents with:  Chest Pain Angina    Stated Complaint: high b/p, panic attack, tingling L, occas chest tightness    HPI    Patient is a 58-year-old male comes to emergency room for evaluation Types: 12 Standard drinks or equivalent per week      Comment: social    Drug use: No             Review of Systems    Positive for stated complaint: high b/p, panic attack, tingling L, occas chest tightness  Other systems are as noted in HPI.   Constitutio within normal limits   TROPONIN I - Normal   D-DIMER - Normal   RAPID SARS-COV-2 BY PCR - Normal   CBC WITH DIFFERENTIAL WITH PLATELET    Narrative: The following orders were created for panel order CBC WITH DIFFERENTIAL WITH PLATELET.   Procedure acetaminophen (TYLENOL) tab 650 mg (has no administration in time range)   ondansetron HCl (ZOFRAN) injection 4 mg (has no administration in time range)   aspirin chewable tab 324 mg (324 mg Oral Given 12/20/20 1324)         ER course: Case discussed wit

## 2020-12-21 ENCOUNTER — APPOINTMENT (OUTPATIENT)
Dept: CV DIAGNOSTICS | Facility: HOSPITAL | Age: 45
End: 2020-12-21
Attending: INTERNAL MEDICINE
Payer: COMMERCIAL

## 2020-12-21 VITALS
BODY MASS INDEX: 29.35 KG/M2 | SYSTOLIC BLOOD PRESSURE: 138 MMHG | HEART RATE: 73 BPM | DIASTOLIC BLOOD PRESSURE: 92 MMHG | HEIGHT: 70 IN | TEMPERATURE: 98 F | RESPIRATION RATE: 18 BRPM | OXYGEN SATURATION: 95 % | WEIGHT: 205 LBS

## 2020-12-21 PROCEDURE — 99204 OFFICE O/P NEW MOD 45 MIN: CPT | Performed by: INTERNAL MEDICINE

## 2020-12-21 PROCEDURE — 78452 HT MUSCLE IMAGE SPECT MULT: CPT | Performed by: INTERNAL MEDICINE

## 2020-12-21 PROCEDURE — B246ZZZ ULTRASONOGRAPHY OF RIGHT AND LEFT HEART: ICD-10-PCS | Performed by: INTERNAL MEDICINE

## 2020-12-21 PROCEDURE — 93017 CV STRESS TEST TRACING ONLY: CPT | Performed by: INTERNAL MEDICINE

## 2020-12-21 PROCEDURE — 4A12XM4 MONITORING OF CARDIAC STRESS, EXTERNAL APPROACH: ICD-10-PCS | Performed by: RADIOLOGY

## 2020-12-21 PROCEDURE — 93306 TTE W/DOPPLER COMPLETE: CPT | Performed by: INTERNAL MEDICINE

## 2020-12-21 PROCEDURE — 99217 OBSERVATION CARE DISCHARGE: CPT | Performed by: INTERNAL MEDICINE

## 2020-12-21 PROCEDURE — 93018 CV STRESS TEST I&R ONLY: CPT | Performed by: INTERNAL MEDICINE

## 2020-12-21 RX ORDER — LOSARTAN POTASSIUM 25 MG/1
25 TABLET ORAL ONCE
Status: COMPLETED | OUTPATIENT
Start: 2020-12-21 | End: 2020-12-21

## 2020-12-21 RX ORDER — LOSARTAN POTASSIUM 50 MG/1
50 TABLET ORAL DAILY
Qty: 30 TABLET | Refills: 5 | Status: SHIPPED | OUTPATIENT
Start: 2020-12-22 | End: 2021-05-10

## 2020-12-21 RX ORDER — ATORVASTATIN CALCIUM 20 MG/1
20 TABLET, FILM COATED ORAL NIGHTLY
Qty: 30 TABLET | Refills: 5 | Status: SHIPPED | OUTPATIENT
Start: 2020-12-21 | End: 2021-04-28

## 2020-12-21 RX ORDER — ATORVASTATIN CALCIUM 20 MG/1
20 TABLET, FILM COATED ORAL NIGHTLY
Status: DISCONTINUED | OUTPATIENT
Start: 2020-12-21 | End: 2020-12-21

## 2020-12-21 RX ORDER — LOSARTAN POTASSIUM 50 MG/1
50 TABLET ORAL DAILY
Status: DISCONTINUED | OUTPATIENT
Start: 2020-12-22 | End: 2020-12-21

## 2020-12-21 RX ORDER — POTASSIUM CHLORIDE 20 MEQ/1
40 TABLET, EXTENDED RELEASE ORAL EVERY 4 HOURS
Status: COMPLETED | OUTPATIENT
Start: 2020-12-21 | End: 2020-12-21

## 2020-12-21 RX ORDER — ASPIRIN 81 MG/1
81 TABLET ORAL DAILY
Qty: 30 TABLET | Refills: 5 | Status: SHIPPED | OUTPATIENT
Start: 2020-12-21

## 2020-12-21 NOTE — PLAN OF CARE
Pt received this am in bed awaiting cardiology consult 2D echo complete  tele NSR denies pain nicotine patch off for possible stress test smoking cessation education initiated with PT.  States he has quit before with chantix and will possible try that again

## 2020-12-21 NOTE — DISCHARGE SUMMARY
[facility]  DISCHARGE SUMMARY     Miah Freeman Monico Patient Status:  Observation    1975 MRN [id number]   [facility] 8NE-A Attending Jona Bertrand MD   Hosp Day # 0 PCP Alpesh Houston DO     Date of Admission: 2020  Date of · medication strength  · how much to take      Take 1 tablet (50 mg total) by mouth daily.    Quantity: 30 tablet  Refills: 5        CONTINUE taking these medications      Instructions Prescription details   ALPRAZolam 0.25 MG Tabs  Commonly known as: DUSTIN 81 MG Tbec  · atorvastatin 20 MG Tabs  · losartan Potassium 50 MG Tabs         [place]    Follow-up appointment:   Nirmala Justice MD  [address] [phone]      As needed

## 2020-12-21 NOTE — PROGRESS NOTES
CARDIODIAGNOSTIC PRELIMINARY REPORT:     FELA protocol completed for 10:14 minutes with no new EKG changes; PVC's     Denied cardiac symptoms     Base:  142/88,m HR: 81; Peak:  200/108, HR: 155 (90%APMHR)    Second set of images pending

## 2020-12-21 NOTE — CONSULTS
[facility] Cardiology  Report of Consultation    Kade Marc Patient Status:  Observation    1975 MRN [id number]   [facility] 8NE-A Attending Zoe Knutson MD   Hosp Day # 0 PCP Jeronimo Ornelas DO     [facility] for [facility] leads..    Echo: Today: -Unremarkable with normal LV systolic function LVEF of 60 to 65% with no wall motion abnormalities. No valvular pathology. No other cardiac history or testing.     History:  Past Medical History:   Diagnosis Date   • Anxiety    • Intravenous, Q4H PRN  •  nicotine (NICODERM CQ) 14 MG/24HR 1 patch, 1 patch, Transdermal, Daily    Review of Systems:     Constitutional: Negative for fever or chills. No significant weight changes. Eyes: Patient denies significant visual changes.   Ears, Without clubbing, cyanosis or edema. Peripheral pulses are 2+. Neurologic: Alert and oriented x3. Cranial nerves intact. Normal sensation and motor function. No focal deficits.   Musculoskeletal: normal range of motion, normal muscle strength, no joint e Anxiety component. No cardiac history but CAD risk factors. D-dimer (-). 2.  Systemic hypertension -takes losartan at home but dose was lowered by half recently, uncontrolled.   3.  Hypercholesterolemia with high triglycerides - known and tried lifestyle

## 2020-12-21 NOTE — PLAN OF CARE
Received pt at 1930. A&Ox4. NSR on tele, lung sounds clear bilaterally, currently on RA. No complaints of pain tonight. Trop now (-) x2. Plan for echo in am and cards eval, possible stress test. Pt updated on POC and currently resting comfortably in bed.

## 2020-12-21 NOTE — PROGRESS NOTES
[facility]  Progress Note     Scott Wahl Pahr Patient Status:  Observation    1975 MRN [id number]   [facility] 8NE-A Attending Salvatore Thomas MD   Hosp Day # 0 PCP Maurizio Ewing DO     Chief Complaint: chest pain    S: Patient 25 mg Oral Daily   • aspirin  325 mg Oral Daily   • Heparin Sodium (Porcine)  5,000 Units Subcutaneous Q8H [facility]   • nicotine  1 patch Transdermal Daily       ASSESSMENT / PLAN:     1. Acute Chest Pain  1. Monitor on telemetry  2. Cardiology to al  3.  Cece Jones

## 2020-12-21 NOTE — PROGRESS NOTES
Pt dc to home. Iv removed with cath intact tele dc'd pt received d/c instructions and follow up instructions RX reviewed and sent home with PT. All questions answered and education given. Pt escoted via wc with transport.

## 2020-12-22 NOTE — PROGRESS NOTES
Called Adin rausch and he said that stress test was normal and Pt ok 'd per cardiology to go home. Hospitalist had ok'd  If ok by cardiology med rec complete and Rx ordered. Pt dc to home.  Iv removed with cath intact tele dc'd pt received d/c instructions a

## 2020-12-28 ENCOUNTER — OFFICE VISIT (OUTPATIENT)
Dept: SURGERY | Facility: CLINIC | Age: 45
End: 2020-12-28
Payer: COMMERCIAL

## 2020-12-28 VITALS — HEART RATE: 76 BPM | DIASTOLIC BLOOD PRESSURE: 95 MMHG | TEMPERATURE: 98 F | SYSTOLIC BLOOD PRESSURE: 139 MMHG

## 2020-12-28 DIAGNOSIS — N52.9 ERECTILE DYSFUNCTION, UNSPECIFIED ERECTILE DYSFUNCTION TYPE: ICD-10-CM

## 2020-12-28 DIAGNOSIS — N48.89 PENILE CHORDEE: Primary | ICD-10-CM

## 2020-12-28 PROCEDURE — 99213 OFFICE O/P EST LOW 20 MIN: CPT | Performed by: UROLOGY

## 2020-12-28 PROCEDURE — 1111F DSCHRG MED/CURRENT MED MERGE: CPT | Performed by: UROLOGY

## 2020-12-28 PROCEDURE — 3080F DIAST BP >= 90 MM HG: CPT | Performed by: UROLOGY

## 2020-12-28 PROCEDURE — 3075F SYST BP GE 130 - 139MM HG: CPT | Performed by: UROLOGY

## 2020-12-28 NOTE — PROGRESS NOTES
Rooming Clinician:     Scott Wahl Pahr is a 54year old male. Patient presents with:   Follow - Up: [place]        HPI:     Patient continues to note some curvature of the distal shaft of the penis to the left side but seems to be milder over the course of t to affected area(s).  50 g 3   • CHANTIX STARTING MONTH BETH 0.5 MG X 11 & 1 MG X 42 Oral Tab USE AS DIRECTED ON PACKAGE (Patient not taking: Reported on 12/28/2020 ) 1 tablet 0       Dust Mites                Mold                       Past Medical History: bilaterally and are normal shape and size.   The prostate exam is deferred today   NEURO: grossly normal  EXTREMITIES: no cyanosis, clubbing or edema    LAB:     Lab Results   Component Value Date    WBC 7.4 12/21/2020    HGB 15.1 12/21/2020    HCT 43.0 12/

## 2020-12-30 ENCOUNTER — OFFICE VISIT (OUTPATIENT)
Dept: FAMILY MEDICINE CLINIC | Facility: CLINIC | Age: 45
End: 2020-12-30
Payer: COMMERCIAL

## 2020-12-30 VITALS
HEART RATE: 78 BPM | TEMPERATURE: 98 F | OXYGEN SATURATION: 98 % | SYSTOLIC BLOOD PRESSURE: 108 MMHG | BODY MASS INDEX: 29.2 KG/M2 | HEIGHT: 70 IN | DIASTOLIC BLOOD PRESSURE: 70 MMHG | RESPIRATION RATE: 20 BRPM | WEIGHT: 204 LBS

## 2020-12-30 DIAGNOSIS — E78.5 HYPERLIPIDEMIA, UNSPECIFIED HYPERLIPIDEMIA TYPE: ICD-10-CM

## 2020-12-30 DIAGNOSIS — F41.9 ANXIETY: ICD-10-CM

## 2020-12-30 DIAGNOSIS — R20.0 NUMBNESS AND TINGLING: ICD-10-CM

## 2020-12-30 DIAGNOSIS — R73.03 PREDIABETES: ICD-10-CM

## 2020-12-30 DIAGNOSIS — F17.200 SMOKING ADDICTION: ICD-10-CM

## 2020-12-30 DIAGNOSIS — F41.9 ANXIETY AND DEPRESSION: ICD-10-CM

## 2020-12-30 DIAGNOSIS — R20.2 NUMBNESS AND TINGLING: ICD-10-CM

## 2020-12-30 DIAGNOSIS — M65.312 TRIGGER FINGER OF LEFT THUMB: ICD-10-CM

## 2020-12-30 DIAGNOSIS — F32.A ANXIETY AND DEPRESSION: ICD-10-CM

## 2020-12-30 DIAGNOSIS — R07.89 ATYPICAL CHEST PAIN: Primary | ICD-10-CM

## 2020-12-30 PROCEDURE — 3008F BODY MASS INDEX DOCD: CPT | Performed by: FAMILY MEDICINE

## 2020-12-30 PROCEDURE — 3074F SYST BP LT 130 MM HG: CPT | Performed by: FAMILY MEDICINE

## 2020-12-30 PROCEDURE — 3078F DIAST BP <80 MM HG: CPT | Performed by: FAMILY MEDICINE

## 2020-12-30 PROCEDURE — 99214 OFFICE O/P EST MOD 30 MIN: CPT | Performed by: FAMILY MEDICINE

## 2020-12-30 RX ORDER — BUPROPION HYDROCHLORIDE 150 MG/1
150 TABLET ORAL DAILY
Qty: 30 TABLET | Refills: 0 | Status: SHIPPED | OUTPATIENT
Start: 2020-12-30 | End: 2021-01-26

## 2020-12-30 RX ORDER — ALPRAZOLAM 0.25 MG/1
0.25 TABLET ORAL 2 TIMES DAILY PRN
Qty: 30 TABLET | Refills: 0 | Status: SHIPPED | OUTPATIENT
Start: 2020-12-30 | End: 2021-07-13

## 2020-12-30 NOTE — PROGRESS NOTES
HPI:    Patient ID: Cristin Marc is a 54year old male. Hx of prediabetes. This is a chronic problem. This problem onset over 1 year ago. This problem occurs constantly. Pts last a1c on 12/21/2020 was 5.9. Pt is not symptomatic.  Pt is managing with diet an been no history of extremity trauma. The problem occurs constantly. The problem has been waxing and waning. The pain is mild. Associated symptoms include joint locking. Pertinent negatives include no joint swelling, numbness or tingling.  Exacerbated by: re myalgias. Neurological: Positive for dizziness. Negative for tingling, focal weakness and numbness. Psychiatric/Behavioral: Negative for self-injury and suicidal ideas. The patient is nervous/anxious. All other systems reviewed and are negative. diagnosis)  Hyperlipidemia, unspecified hyperlipidemia type  Trigger finger of left thumb  Anxiety and depression  Smoking addiction  Numbness and tingling  Prediabetes  Anxiety    1. Atypical chest pain  No additional testing recommended at this time.  Leandro Varghese [E]      Lipid Panel [E]      Vitamin B1 (Thiamine), Blood [E]      Vitamin B12 [E]      Meds This Visit:  Requested Prescriptions     Signed Prescriptions Disp Refills   • buPROPion HCl ER, XL, 150 MG Oral Tablet 24 Hr 30 tablet 0     Sig: Take 1 tablet (

## 2021-01-25 ENCOUNTER — OFFICE VISIT (OUTPATIENT)
Dept: FAMILY MEDICINE CLINIC | Facility: CLINIC | Age: 46
End: 2021-01-25
Payer: COMMERCIAL

## 2021-01-25 ENCOUNTER — LAB ENCOUNTER (OUTPATIENT)
Dept: LAB | Age: 46
End: 2021-01-25
Attending: FAMILY MEDICINE
Payer: COMMERCIAL

## 2021-01-25 VITALS
BODY MASS INDEX: 28.63 KG/M2 | HEART RATE: 90 BPM | DIASTOLIC BLOOD PRESSURE: 86 MMHG | OXYGEN SATURATION: 98 % | HEIGHT: 70 IN | SYSTOLIC BLOOD PRESSURE: 128 MMHG | RESPIRATION RATE: 16 BRPM | WEIGHT: 200 LBS

## 2021-01-25 DIAGNOSIS — M54.50 ACUTE LEFT-SIDED LOW BACK PAIN WITHOUT SCIATICA: Primary | ICD-10-CM

## 2021-01-25 DIAGNOSIS — F41.9 ANXIETY AND DEPRESSION: ICD-10-CM

## 2021-01-25 DIAGNOSIS — R00.2 PALPITATIONS: ICD-10-CM

## 2021-01-25 DIAGNOSIS — F17.200 SMOKING ADDICTION: ICD-10-CM

## 2021-01-25 DIAGNOSIS — M54.50 ACUTE LEFT-SIDED LOW BACK PAIN WITHOUT SCIATICA: ICD-10-CM

## 2021-01-25 DIAGNOSIS — F32.A ANXIETY AND DEPRESSION: ICD-10-CM

## 2021-01-25 LAB
BILIRUB UR QL STRIP.AUTO: NEGATIVE
CLARITY UR REFRACT.AUTO: CLEAR
COLOR UR AUTO: YELLOW
GLUCOSE UR STRIP.AUTO-MCNC: NEGATIVE MG/DL
LEUKOCYTE ESTERASE UR QL STRIP.AUTO: NEGATIVE
NITRITE UR QL STRIP.AUTO: NEGATIVE
PH UR STRIP.AUTO: 5 [PH] (ref 4.5–8)
PROT UR STRIP.AUTO-MCNC: 30 MG/DL
RBC UR QL AUTO: NEGATIVE
SP GR UR STRIP.AUTO: 1.03 (ref 1–1.03)
UROBILINOGEN UR STRIP.AUTO-MCNC: <2 MG/DL

## 2021-01-25 PROCEDURE — 99214 OFFICE O/P EST MOD 30 MIN: CPT | Performed by: FAMILY MEDICINE

## 2021-01-25 PROCEDURE — 81001 URINALYSIS AUTO W/SCOPE: CPT

## 2021-01-25 PROCEDURE — 3074F SYST BP LT 130 MM HG: CPT | Performed by: FAMILY MEDICINE

## 2021-01-25 PROCEDURE — 3079F DIAST BP 80-89 MM HG: CPT | Performed by: FAMILY MEDICINE

## 2021-01-25 PROCEDURE — 3008F BODY MASS INDEX DOCD: CPT | Performed by: FAMILY MEDICINE

## 2021-01-25 RX ORDER — CYCLOBENZAPRINE HCL 10 MG
10 TABLET ORAL 3 TIMES DAILY
Qty: 30 TABLET | Refills: 1 | Status: SHIPPED | OUTPATIENT
Start: 2021-01-25 | End: 2021-02-14

## 2021-01-25 RX ORDER — MELOXICAM 15 MG/1
15 TABLET ORAL DAILY
Qty: 14 TABLET | Refills: 0 | Status: SHIPPED | OUTPATIENT
Start: 2021-01-25 | End: 2021-11-08 | Stop reason: ALTCHOICE

## 2021-01-25 NOTE — PROGRESS NOTES
[facility] Progress Note    SUBJECTIVE: Walker Lopez Pahr 54year old male is here today for Patient presents with:  Musculoskeletal Problem: c/o pain, left size feels jittery, started after shoveling snow      Has been having a lot of pain in his back • cyclobenzaprine 10 MG Oral Tab Take 1 tablet (10 mg total) by mouth 3 (three) times daily for 20 days. 30 tablet 1   • buPROPion HCl ER, XL, 150 MG Oral Tablet 24 Hr Take 1 tablet (150 mg total) by mouth daily.  30 tablet 0   • ALPRAZolam 0.25 MG Oral T stimulant in some cases and consider other options. Total Time spent with patient and coordinating care:  25 minutes. Follow up: as needed, to consider medication cahnges after seeing holter results.       Madai Patel MD

## 2021-01-26 ENCOUNTER — PATIENT MESSAGE (OUTPATIENT)
Dept: FAMILY MEDICINE CLINIC | Facility: CLINIC | Age: 46
End: 2021-01-26

## 2021-01-26 RX ORDER — BUPROPION HYDROCHLORIDE 150 MG/1
TABLET ORAL
Qty: 30 TABLET | Refills: 0 | Status: SHIPPED | OUTPATIENT
Start: 2021-01-26 | End: 2021-04-21

## 2021-01-26 NOTE — TELEPHONE ENCOUNTER
From: Michael Marc  To: Casandra Batista MD  Sent: 1/26/2021 8:21 AM CST  Subject: Non-Urgent Medical Question    I have a question about URINALYSIS, ROUTINE resulted on 1/25/21, 5:47 PM.    Any concerns about the Protein in the urine?

## 2021-01-26 NOTE — TELEPHONE ENCOUNTER
Will approve but potentially would consider removing in the future depending on ongoing symptoms and work up.

## 2021-04-21 DIAGNOSIS — F32.A ANXIETY AND DEPRESSION: ICD-10-CM

## 2021-04-21 DIAGNOSIS — F41.9 ANXIETY AND DEPRESSION: ICD-10-CM

## 2021-04-21 DIAGNOSIS — F17.200 SMOKING ADDICTION: ICD-10-CM

## 2021-04-21 RX ORDER — BUPROPION HYDROCHLORIDE 150 MG/1
150 TABLET ORAL DAILY
Qty: 90 TABLET | Refills: 0 | Status: SHIPPED | OUTPATIENT
Start: 2021-04-21 | End: 2021-10-04

## 2021-04-28 RX ORDER — ATORVASTATIN CALCIUM 20 MG/1
20 TABLET, FILM COATED ORAL NIGHTLY
Qty: 90 TABLET | Refills: 1 | Status: SHIPPED | OUTPATIENT
Start: 2021-04-28 | End: 2021-11-08

## 2021-05-10 RX ORDER — LOSARTAN POTASSIUM 50 MG/1
50 TABLET ORAL DAILY
Qty: 90 TABLET | Refills: 1 | Status: SHIPPED | OUTPATIENT
Start: 2021-05-10 | End: 2021-11-08

## 2021-05-16 ENCOUNTER — IMMUNIZATION (OUTPATIENT)
Dept: LAB | Facility: HOSPITAL | Age: 46
End: 2021-05-16
Attending: EMERGENCY MEDICINE
Payer: COMMERCIAL

## 2021-05-16 DIAGNOSIS — Z23 NEED FOR VACCINATION: Primary | ICD-10-CM

## 2021-05-16 PROCEDURE — 0001A SARSCOV2 VAC 30MCG/0.3ML IM: CPT

## 2021-05-21 ENCOUNTER — TELEPHONE (OUTPATIENT)
Dept: FAMILY MEDICINE CLINIC | Facility: CLINIC | Age: 46
End: 2021-05-21

## 2021-05-21 NOTE — TELEPHONE ENCOUNTER
Patient states he spoke with [facility] about getting a new CPAP machine as his has been making a whining noise. He has had machine for about 4 years. Kandice Starks told him that we would need to fax a request to them. Fax [phone]  Phone [phone]    Please advise.

## 2021-06-03 NOTE — TELEPHONE ENCOUNTER
Fax recd from [facility] for supplies    Placed in [facility] 6356 triage bin
Referral approved - faxed to [phone]. Form to scan, copy in triage.
Referral entered, await Authorization.
[facility]

## 2021-06-07 ENCOUNTER — IMMUNIZATION (OUTPATIENT)
Dept: LAB | Facility: HOSPITAL | Age: 46
End: 2021-06-07
Attending: EMERGENCY MEDICINE
Payer: COMMERCIAL

## 2021-06-07 DIAGNOSIS — Z23 NEED FOR VACCINATION: Primary | ICD-10-CM

## 2021-06-07 PROCEDURE — 0002A SARSCOV2 VAC 30MCG/0.3ML IM: CPT

## 2021-07-11 DIAGNOSIS — F41.9 ANXIETY: ICD-10-CM

## 2021-07-13 RX ORDER — ALPRAZOLAM 0.25 MG/1
TABLET ORAL
Qty: 30 TABLET | Refills: 0 | Status: SHIPPED | OUTPATIENT
Start: 2021-07-13 | End: 2021-11-08

## 2021-10-04 DIAGNOSIS — F32.A ANXIETY AND DEPRESSION: ICD-10-CM

## 2021-10-04 DIAGNOSIS — F41.9 ANXIETY AND DEPRESSION: ICD-10-CM

## 2021-10-04 DIAGNOSIS — F17.200 SMOKING ADDICTION: ICD-10-CM

## 2021-10-04 RX ORDER — BUPROPION HYDROCHLORIDE 150 MG/1
TABLET ORAL
Qty: 90 TABLET | Refills: 1 | Status: SHIPPED | OUTPATIENT
Start: 2021-10-04 | End: 2021-11-08

## 2021-11-08 ENCOUNTER — OFFICE VISIT (OUTPATIENT)
Dept: FAMILY MEDICINE CLINIC | Facility: CLINIC | Age: 46
End: 2021-11-08
Payer: COMMERCIAL

## 2021-11-08 VITALS
DIASTOLIC BLOOD PRESSURE: 84 MMHG | WEIGHT: 206 LBS | RESPIRATION RATE: 16 BRPM | OXYGEN SATURATION: 98 % | HEART RATE: 76 BPM | HEIGHT: 70 IN | SYSTOLIC BLOOD PRESSURE: 128 MMHG | BODY MASS INDEX: 29.49 KG/M2

## 2021-11-08 DIAGNOSIS — Z12.12 SCREENING FOR COLORECTAL CANCER: ICD-10-CM

## 2021-11-08 DIAGNOSIS — G47.33 OSA (OBSTRUCTIVE SLEEP APNEA): ICD-10-CM

## 2021-11-08 DIAGNOSIS — Z12.5 PROSTATE CANCER SCREENING: ICD-10-CM

## 2021-11-08 DIAGNOSIS — F41.9 ANXIETY AND DEPRESSION: ICD-10-CM

## 2021-11-08 DIAGNOSIS — Z12.11 SCREENING FOR COLORECTAL CANCER: ICD-10-CM

## 2021-11-08 DIAGNOSIS — Z13.220 LIPID SCREENING: ICD-10-CM

## 2021-11-08 DIAGNOSIS — F41.9 ANXIETY: ICD-10-CM

## 2021-11-08 DIAGNOSIS — I10 BENIGN ESSENTIAL HTN: ICD-10-CM

## 2021-11-08 DIAGNOSIS — Z13.0 SCREENING FOR DEFICIENCY ANEMIA: ICD-10-CM

## 2021-11-08 DIAGNOSIS — E78.49 OTHER HYPERLIPIDEMIA: ICD-10-CM

## 2021-11-08 DIAGNOSIS — F32.A ANXIETY AND DEPRESSION: ICD-10-CM

## 2021-11-08 DIAGNOSIS — F17.200 SMOKING ADDICTION: ICD-10-CM

## 2021-11-08 DIAGNOSIS — Z00.00 WELLNESS EXAMINATION: Primary | ICD-10-CM

## 2021-11-08 DIAGNOSIS — R73.09 ELEVATED GLUCOSE: ICD-10-CM

## 2021-11-08 PROCEDURE — 3074F SYST BP LT 130 MM HG: CPT | Performed by: FAMILY MEDICINE

## 2021-11-08 PROCEDURE — 3079F DIAST BP 80-89 MM HG: CPT | Performed by: FAMILY MEDICINE

## 2021-11-08 PROCEDURE — 99396 PREV VISIT EST AGE 40-64: CPT | Performed by: FAMILY MEDICINE

## 2021-11-08 PROCEDURE — 90471 IMMUNIZATION ADMIN: CPT | Performed by: FAMILY MEDICINE

## 2021-11-08 PROCEDURE — 90686 IIV4 VACC NO PRSV 0.5 ML IM: CPT | Performed by: FAMILY MEDICINE

## 2021-11-08 PROCEDURE — 3008F BODY MASS INDEX DOCD: CPT | Performed by: FAMILY MEDICINE

## 2021-11-08 RX ORDER — LOSARTAN POTASSIUM 50 MG/1
50 TABLET ORAL DAILY
Qty: 90 TABLET | Refills: 1 | Status: SHIPPED | OUTPATIENT
Start: 2021-11-08

## 2021-11-08 RX ORDER — BUPROPION HYDROCHLORIDE 150 MG/1
150 TABLET ORAL DAILY
Qty: 90 TABLET | Refills: 1 | Status: SHIPPED | OUTPATIENT
Start: 2021-11-08

## 2021-11-08 RX ORDER — ALPRAZOLAM 0.25 MG/1
0.25 TABLET ORAL 2 TIMES DAILY
Qty: 30 TABLET | Refills: 0 | Status: SHIPPED | OUTPATIENT
Start: 2021-11-08

## 2021-11-08 RX ORDER — ALPRAZOLAM 0.25 MG/1
0.25 TABLET ORAL 2 TIMES DAILY
Qty: 30 TABLET | Refills: 0 | Status: CANCELLED | OUTPATIENT
Start: 2021-11-08

## 2021-11-08 RX ORDER — ATORVASTATIN CALCIUM 20 MG/1
20 TABLET, FILM COATED ORAL NIGHTLY
Qty: 90 TABLET | Refills: 1 | Status: SHIPPED | OUTPATIENT
Start: 2021-11-08

## 2021-11-08 NOTE — PROGRESS NOTES
HPI:   Norah Marc is a 51year old male who presents for an Annual Health Visit. Has sleep apnea, and his CPAP machine is whining. James Awan is supplier. Can't sleep without CPAP, sleeps better, and feels more rested.     Benefits function during th cholesterol    • Hyperlipidemia    • Unspecified essential hypertension       Past Surgical History:   Procedure Laterality Date   • OTHER SURGICAL HISTORY  2014    anal fissure   • REINSERT BI/TRICEPS TENDON,DISTAL Left 4/11/2016    Procedure: BICEPS TEND atraumatic  Eyes: conjunctivae/corneas clear. PERRL, EOM's intact. Fundi benign. Ears: normal TM's and external ear canals both ears  Nose: Nares normal. Septum midline. Mucosa normal. No drainage or sinus tenderness.   Throat: lips, mucosa, and tongue nor Future    Screening for colorectal cancer  -     GASTRO - INTERNAL    Anxiety  -     ALPRAZolam 0.25 MG Oral Tab; Take 1 tablet (0.25 mg total) by mouth 2 (two) times daily.     Other orders  -     FLULAVAL INFLUENZA VACCINE QUAD PRESERVATIVE FREE 0.5 ML

## 2021-11-13 ENCOUNTER — LAB ENCOUNTER (OUTPATIENT)
Dept: LAB | Age: 46
End: 2021-11-13
Attending: FAMILY MEDICINE
Payer: COMMERCIAL

## 2021-11-13 DIAGNOSIS — E78.5 HYPERLIPIDEMIA, UNSPECIFIED HYPERLIPIDEMIA TYPE: ICD-10-CM

## 2021-11-13 DIAGNOSIS — Z13.0 SCREENING FOR DEFICIENCY ANEMIA: ICD-10-CM

## 2021-11-13 DIAGNOSIS — R20.2 NUMBNESS AND TINGLING: ICD-10-CM

## 2021-11-13 DIAGNOSIS — R20.0 NUMBNESS AND TINGLING: ICD-10-CM

## 2021-11-13 DIAGNOSIS — R73.09 ELEVATED GLUCOSE: ICD-10-CM

## 2021-11-13 DIAGNOSIS — Z13.220 LIPID SCREENING: ICD-10-CM

## 2021-11-13 DIAGNOSIS — Z00.00 WELLNESS EXAMINATION: ICD-10-CM

## 2021-11-13 DIAGNOSIS — Z12.5 PROSTATE CANCER SCREENING: ICD-10-CM

## 2021-11-13 PROCEDURE — 82607 VITAMIN B-12: CPT | Performed by: FAMILY MEDICINE

## 2021-11-13 PROCEDURE — 84425 ASSAY OF VITAMIN B-1: CPT | Performed by: FAMILY MEDICINE

## 2021-11-13 PROCEDURE — 80053 COMPREHEN METABOLIC PANEL: CPT | Performed by: FAMILY MEDICINE

## 2021-11-13 PROCEDURE — 80061 LIPID PANEL: CPT | Performed by: FAMILY MEDICINE

## 2021-11-13 PROCEDURE — 85025 COMPLETE CBC W/AUTO DIFF WBC: CPT | Performed by: FAMILY MEDICINE

## 2021-11-13 PROCEDURE — 84153 ASSAY OF PSA TOTAL: CPT | Performed by: FAMILY MEDICINE

## 2021-11-13 PROCEDURE — 83036 HEMOGLOBIN GLYCOSYLATED A1C: CPT | Performed by: FAMILY MEDICINE

## 2021-12-15 ENCOUNTER — TELEPHONE (OUTPATIENT)
Dept: FAMILY MEDICINE CLINIC | Facility: CLINIC | Age: 46
End: 2021-12-15

## 2021-12-15 NOTE — TELEPHONE ENCOUNTER
Patient is calling in regards to needing a new script sent to [address] [place] [address] for a new machine along with pressure setting - fax # [phone]. It needs to indicate that the machine is \"whining\"    Please advise. This Ma called and spoke to mother, Scheduled next available per Dr. Solitario. Mother had no further questions or concerns at this time.     Emailed Portal instrcutions to email provided by mother: [email or web address]     Mother will try to activate portal if not will need zoom information.

## 2021-12-22 NOTE — TELEPHONE ENCOUNTER
Referral still pending. Message sent to referral dept asking them for update. My chart sent letting pt know referral still pending.

## 2022-01-11 ENCOUNTER — LAB ENCOUNTER (OUTPATIENT)
Dept: LAB | Age: 47
End: 2022-01-11
Attending: STUDENT IN AN ORGANIZED HEALTH CARE EDUCATION/TRAINING PROGRAM
Payer: COMMERCIAL

## 2022-01-11 DIAGNOSIS — Z01.818 PRE-OP TESTING: ICD-10-CM

## 2022-01-13 LAB — SARS-COV-2 RNA RESP QL NAA+PROBE: NOT DETECTED

## 2022-01-14 PROBLEM — Z12.11 ENCOUNTER FOR SCREENING COLONOSCOPY: Status: ACTIVE | Noted: 2022-01-14

## 2022-01-24 ENCOUNTER — PATIENT MESSAGE (OUTPATIENT)
Dept: FAMILY MEDICINE CLINIC | Facility: CLINIC | Age: 47
End: 2022-01-24

## 2022-01-26 NOTE — TELEPHONE ENCOUNTER
From: Donato Marc  Sent: 1/24/2022 4:11 PM CST  To: [facility] Clinical Staff  Subject: CPAP    Thank you!

## 2022-02-12 ENCOUNTER — IMMUNIZATION (OUTPATIENT)
Dept: LAB | Age: 47
End: 2022-02-12
Attending: EMERGENCY MEDICINE
Payer: COMMERCIAL

## 2022-02-12 ENCOUNTER — PATIENT MESSAGE (OUTPATIENT)
Dept: FAMILY MEDICINE CLINIC | Facility: CLINIC | Age: 47
End: 2022-02-12

## 2022-02-12 DIAGNOSIS — Z23 NEED FOR VACCINATION: Primary | ICD-10-CM

## 2022-02-12 PROCEDURE — 0054A SARSCOV2 VAC 30MCG TRS SUCR: CPT

## 2022-02-14 RX ORDER — ALPRAZOLAM 0.25 MG/1
0.25 TABLET ORAL 2 TIMES DAILY
Qty: 30 TABLET | Refills: 0 | Status: SHIPPED | OUTPATIENT
Start: 2022-02-14

## 2022-06-27 DIAGNOSIS — F41.9 ANXIETY: ICD-10-CM

## 2022-06-27 RX ORDER — ALPRAZOLAM 0.25 MG/1
0.25 TABLET ORAL 2 TIMES DAILY
Qty: 30 TABLET | Refills: 0 | Status: SHIPPED | OUTPATIENT
Start: 2022-06-27

## 2022-06-30 DIAGNOSIS — N52.9 ERECTILE DYSFUNCTION, UNSPECIFIED ERECTILE DYSFUNCTION TYPE: ICD-10-CM

## 2022-07-06 RX ORDER — TADALAFIL 20 MG/1
TABLET ORAL
Qty: 30 TABLET | Refills: 0 | OUTPATIENT
Start: 2022-07-06

## 2022-07-06 NOTE — TELEPHONE ENCOUNTER
Refill request for tadalafil. LOV greater than 1 year ago on 12/28/2020. No future appt scheduled at this time.  Refill will be denied at this time and mcm sent to the patient

## 2022-10-19 DIAGNOSIS — E78.49 OTHER HYPERLIPIDEMIA: ICD-10-CM

## 2022-10-19 RX ORDER — ATORVASTATIN CALCIUM 20 MG/1
TABLET, FILM COATED ORAL
Qty: 90 TABLET | Refills: 3 | Status: SHIPPED | OUTPATIENT
Start: 2022-10-19

## 2022-11-04 DIAGNOSIS — F41.9 ANXIETY: ICD-10-CM

## 2023-02-13 ENCOUNTER — OFFICE VISIT (OUTPATIENT)
Dept: FAMILY MEDICINE CLINIC | Facility: CLINIC | Age: 48
End: 2023-02-13
Payer: COMMERCIAL

## 2023-02-13 VITALS
WEIGHT: 203 LBS | DIASTOLIC BLOOD PRESSURE: 84 MMHG | BODY MASS INDEX: 28.42 KG/M2 | SYSTOLIC BLOOD PRESSURE: 128 MMHG | RESPIRATION RATE: 16 BRPM | HEART RATE: 74 BPM | OXYGEN SATURATION: 98 % | HEIGHT: 71 IN

## 2023-02-13 DIAGNOSIS — R73.03 PREDIABETES: ICD-10-CM

## 2023-02-13 DIAGNOSIS — E55.9 VITAMIN D DEFICIENCY: ICD-10-CM

## 2023-02-13 DIAGNOSIS — F32.A ANXIETY AND DEPRESSION: ICD-10-CM

## 2023-02-13 DIAGNOSIS — F10.24 ALCOHOL DEPENDENCE WITH ALCOHOL-INDUCED MOOD DISORDER (HCC): ICD-10-CM

## 2023-02-13 DIAGNOSIS — F41.9 ANXIETY AND DEPRESSION: ICD-10-CM

## 2023-02-13 DIAGNOSIS — Z00.00 ANNUAL PHYSICAL EXAM: Primary | ICD-10-CM

## 2023-02-13 DIAGNOSIS — Z12.5 PROSTATE CANCER SCREENING: ICD-10-CM

## 2023-02-13 DIAGNOSIS — I10 BENIGN ESSENTIAL HTN: ICD-10-CM

## 2023-02-13 DIAGNOSIS — R06.2 WHEEZING: ICD-10-CM

## 2023-02-13 DIAGNOSIS — Z23 NEED FOR VACCINATION FOR PNEUMOCOCCUS: ICD-10-CM

## 2023-02-13 RX ORDER — ALBUTEROL SULFATE 90 UG/1
2 AEROSOL, METERED RESPIRATORY (INHALATION) EVERY 6 HOURS PRN
Qty: 1 EACH | Refills: 1 | Status: SHIPPED | OUTPATIENT
Start: 2023-02-13

## 2023-02-13 RX ORDER — ESCITALOPRAM OXALATE 5 MG/1
5 TABLET ORAL DAILY
Qty: 30 TABLET | Refills: 1 | Status: SHIPPED | OUTPATIENT
Start: 2023-02-13

## 2023-02-13 RX ORDER — ACAMPROSATE CALCIUM 333 MG/1
333 TABLET, DELAYED RELEASE ORAL 3 TIMES DAILY
Qty: 90 TABLET | Refills: 1 | Status: SHIPPED | OUTPATIENT
Start: 2023-02-13

## 2023-02-13 NOTE — PATIENT INSTRUCTIONS
+ prediabetes. Recheck in 6-12 months. Diet, exercise, avoid white carbs, avoid simple carbs, avoid sugars. No soda, no juice please. Moderately high LDL. Diet, aerobic exercise, avoid fatty foods. Avoid red meat, avoid cheese, avoid fried foods. Eat more fiber. Recheck in 6-12 months.

## 2023-02-18 ENCOUNTER — LAB ENCOUNTER (OUTPATIENT)
Dept: LAB | Age: 48
End: 2023-02-18
Attending: FAMILY MEDICINE
Payer: COMMERCIAL

## 2023-02-18 DIAGNOSIS — R73.03 PREDIABETES: ICD-10-CM

## 2023-02-18 DIAGNOSIS — Z00.00 ANNUAL PHYSICAL EXAM: ICD-10-CM

## 2023-02-18 LAB
ALBUMIN SERPL-MCNC: 4.3 G/DL (ref 3.4–5)
ALBUMIN/GLOB SERPL: 1.3 {RATIO} (ref 1–2)
ALP LIVER SERPL-CCNC: 116 U/L
ALT SERPL-CCNC: 75 U/L
ANION GAP SERPL CALC-SCNC: 4 MMOL/L (ref 0–18)
AST SERPL-CCNC: 31 U/L (ref 15–37)
BASOPHILS # BLD AUTO: 0.08 X10(3) UL (ref 0–0.2)
BASOPHILS NFR BLD AUTO: 0.8 %
BILIRUB SERPL-MCNC: 0.6 MG/DL (ref 0.1–2)
BUN BLD-MCNC: 15 MG/DL (ref 7–18)
CALCIUM BLD-MCNC: 9.4 MG/DL (ref 8.5–10.1)
CHLORIDE SERPL-SCNC: 106 MMOL/L (ref 98–112)
CHOLEST SERPL-MCNC: 213 MG/DL (ref ?–200)
CO2 SERPL-SCNC: 27 MMOL/L (ref 21–32)
COMPLEXED PSA SERPL-MCNC: 0.85 NG/ML (ref ?–4)
CREAT BLD-MCNC: 1.01 MG/DL
EOSINOPHIL # BLD AUTO: 0.91 X10(3) UL (ref 0–0.7)
EOSINOPHIL NFR BLD AUTO: 9.5 %
ERYTHROCYTE [DISTWIDTH] IN BLOOD BY AUTOMATED COUNT: 12.6 %
EST. AVERAGE GLUCOSE BLD GHB EST-MCNC: 131 MG/DL (ref 68–126)
FASTING PATIENT LIPID ANSWER: YES
FASTING STATUS PATIENT QL REPORTED: YES
GFR SERPLBLD BASED ON 1.73 SQ M-ARVRAT: 92 ML/MIN/1.73M2 (ref 60–?)
GLOBULIN PLAS-MCNC: 3.3 G/DL (ref 2.8–4.4)
GLUCOSE BLD-MCNC: 123 MG/DL (ref 70–99)
HBA1C MFR BLD: 6.2 % (ref ?–5.7)
HCT VFR BLD AUTO: 46.3 %
HDLC SERPL-MCNC: 73 MG/DL (ref 40–59)
HGB BLD-MCNC: 15.7 G/DL
IMM GRANULOCYTES # BLD AUTO: 0.03 X10(3) UL (ref 0–1)
IMM GRANULOCYTES NFR BLD: 0.3 %
LDLC SERPL CALC-MCNC: 108 MG/DL (ref ?–100)
LYMPHOCYTES # BLD AUTO: 1.64 X10(3) UL (ref 1–4)
LYMPHOCYTES NFR BLD AUTO: 17.1 %
MCH RBC QN AUTO: 35.4 PG (ref 26–34)
MCHC RBC AUTO-ENTMCNC: 33.9 G/DL (ref 31–37)
MCV RBC AUTO: 104.3 FL
MONOCYTES # BLD AUTO: 0.98 X10(3) UL (ref 0.1–1)
MONOCYTES NFR BLD AUTO: 10.2 %
NEUTROPHILS # BLD AUTO: 5.94 X10 (3) UL (ref 1.5–7.7)
NEUTROPHILS # BLD AUTO: 5.94 X10(3) UL (ref 1.5–7.7)
NEUTROPHILS NFR BLD AUTO: 62.1 %
NONHDLC SERPL-MCNC: 140 MG/DL (ref ?–130)
OSMOLALITY SERPL CALC.SUM OF ELEC: 286 MOSM/KG (ref 275–295)
PLATELET # BLD AUTO: 227 10(3)UL (ref 150–450)
POTASSIUM SERPL-SCNC: 4.2 MMOL/L (ref 3.5–5.1)
PROT SERPL-MCNC: 7.6 G/DL (ref 6.4–8.2)
RBC # BLD AUTO: 4.44 X10(6)UL
SODIUM SERPL-SCNC: 137 MMOL/L (ref 136–145)
T4 FREE SERPL-MCNC: 0.8 NG/DL (ref 0.8–1.7)
TRIGL SERPL-MCNC: 188 MG/DL (ref 30–149)
TSI SER-ACNC: 4.59 MIU/ML (ref 0.36–3.74)
VLDLC SERPL CALC-MCNC: 32 MG/DL (ref 0–30)
WBC # BLD AUTO: 9.6 X10(3) UL (ref 4–11)

## 2023-02-18 PROCEDURE — 80053 COMPREHEN METABOLIC PANEL: CPT

## 2023-02-18 PROCEDURE — 80061 LIPID PANEL: CPT

## 2023-02-18 PROCEDURE — 84443 ASSAY THYROID STIM HORMONE: CPT

## 2023-02-18 PROCEDURE — 84439 ASSAY OF FREE THYROXINE: CPT

## 2023-02-18 PROCEDURE — 36415 COLL VENOUS BLD VENIPUNCTURE: CPT

## 2023-02-18 PROCEDURE — 85025 COMPLETE CBC W/AUTO DIFF WBC: CPT

## 2023-02-18 PROCEDURE — 83036 HEMOGLOBIN GLYCOSYLATED A1C: CPT

## 2023-04-21 DIAGNOSIS — I10 BENIGN ESSENTIAL HTN: ICD-10-CM

## 2023-04-21 RX ORDER — LOSARTAN POTASSIUM 50 MG/1
TABLET ORAL
Qty: 90 TABLET | Refills: 3 | Status: SHIPPED | OUTPATIENT
Start: 2023-04-21

## 2023-04-23 DIAGNOSIS — F41.9 ANXIETY: ICD-10-CM

## 2023-04-25 RX ORDER — ALPRAZOLAM 0.25 MG/1
TABLET ORAL
Qty: 30 TABLET | Refills: 0 | Status: SHIPPED | OUTPATIENT
Start: 2023-04-25

## 2023-07-23 DIAGNOSIS — F41.9 ANXIETY: ICD-10-CM

## 2023-07-24 NOTE — TELEPHONE ENCOUNTER
.A refill request was received for:  Requested Prescriptions     Pending Prescriptions Disp Refills    ALPRAZOLAM 0.25 MG Oral Tab [Pharmacy Med Name: Alprazolam 0.25 Mg Tab Sand] 30 tablet 0     Sig: TAKE ONE TABLET BY MOUTH TWICE DAILY AS NEEDED       Last refill date:   4/25/2023    Last office visit: 2/13/2023    Follow up due:  Future Appointments   Date Time Provider Oralia Haywood   7/28/2023  8:30 AM Ladonna Contreras MD [facility]

## 2023-07-25 RX ORDER — ALPRAZOLAM 0.25 MG/1
0.25 TABLET ORAL 2 TIMES DAILY PRN
Qty: 30 TABLET | Refills: 0 | Status: SHIPPED | OUTPATIENT
Start: 2023-07-25

## 2023-08-15 DIAGNOSIS — R06.2 WHEEZING: ICD-10-CM

## 2023-08-16 RX ORDER — ALBUTEROL SULFATE 90 UG/1
2 AEROSOL, METERED RESPIRATORY (INHALATION) EVERY 6 HOURS PRN
Qty: 8.5 G | Refills: 0 | Status: SHIPPED | OUTPATIENT
Start: 2023-08-16

## 2023-09-23 ENCOUNTER — LAB ENCOUNTER (OUTPATIENT)
Dept: LAB | Age: 48
End: 2023-09-23
Attending: FAMILY MEDICINE
Payer: COMMERCIAL

## 2023-09-23 DIAGNOSIS — R79.89 ABNORMAL TSH: ICD-10-CM

## 2023-09-23 DIAGNOSIS — E78.2 MIXED HYPERLIPIDEMIA: ICD-10-CM

## 2023-09-23 LAB
CHOLEST SERPL-MCNC: 191 MG/DL (ref ?–200)
FASTING PATIENT LIPID ANSWER: YES
HDLC SERPL-MCNC: 69 MG/DL (ref 40–59)
LDLC SERPL CALC-MCNC: 72 MG/DL (ref ?–100)
NONHDLC SERPL-MCNC: 122 MG/DL (ref ?–130)
THYROGLOB SERPL-MCNC: <15 U/ML (ref ?–60)
THYROPEROXIDASE AB SERPL-ACNC: 30 U/ML (ref ?–60)
TRIGL SERPL-MCNC: 318 MG/DL (ref 30–149)
TSI SER-ACNC: 3.61 MIU/ML (ref 0.36–3.74)
VLDLC SERPL CALC-MCNC: 49 MG/DL (ref 0–30)

## 2023-09-23 PROCEDURE — 86800 THYROGLOBULIN ANTIBODY: CPT

## 2023-09-23 PROCEDURE — 80061 LIPID PANEL: CPT

## 2023-09-23 PROCEDURE — 84443 ASSAY THYROID STIM HORMONE: CPT

## 2023-09-23 PROCEDURE — 86376 MICROSOMAL ANTIBODY EACH: CPT

## 2023-09-23 PROCEDURE — 36415 COLL VENOUS BLD VENIPUNCTURE: CPT

## 2023-09-26 ENCOUNTER — TELEMEDICINE (OUTPATIENT)
Dept: INTERNAL MEDICINE CLINIC | Facility: CLINIC | Age: 48
End: 2023-09-26
Payer: COMMERCIAL

## 2023-09-26 VITALS — HEIGHT: 70 IN | BODY MASS INDEX: 30.06 KG/M2 | WEIGHT: 210 LBS

## 2023-09-26 DIAGNOSIS — R73.03 PREDIABETES: ICD-10-CM

## 2023-09-26 DIAGNOSIS — E66.9 CLASS 1 OBESITY WITH SERIOUS COMORBIDITY AND BODY MASS INDEX (BMI) OF 30.0 TO 30.9 IN ADULT, UNSPECIFIED OBESITY TYPE: ICD-10-CM

## 2023-09-26 DIAGNOSIS — I10 BENIGN ESSENTIAL HTN: ICD-10-CM

## 2023-09-26 DIAGNOSIS — Z51.81 ENCOUNTER FOR THERAPEUTIC DRUG LEVEL MONITORING: Primary | ICD-10-CM

## 2023-09-26 DIAGNOSIS — G47.33 OSA ON CPAP: ICD-10-CM

## 2023-09-26 DIAGNOSIS — E88.819 INSULIN RESISTANCE: ICD-10-CM

## 2023-09-26 DIAGNOSIS — E88.810 METABOLIC SYNDROME: ICD-10-CM

## 2023-09-26 DIAGNOSIS — E78.5 DYSLIPIDEMIA: ICD-10-CM

## 2023-09-26 PROCEDURE — 99204 OFFICE O/P NEW MOD 45 MIN: CPT | Performed by: PHYSICIAN ASSISTANT

## 2023-09-26 RX ORDER — SEMAGLUTIDE 0.68 MG/ML
INJECTION, SOLUTION SUBCUTANEOUS
Qty: 9 ML | Refills: 0 | Status: SHIPPED | OUTPATIENT
Start: 2023-09-26

## 2023-10-02 ENCOUNTER — LAB ENCOUNTER (OUTPATIENT)
Dept: LAB | Age: 48
End: 2023-10-02
Attending: PHYSICIAN ASSISTANT
Payer: COMMERCIAL

## 2023-10-02 DIAGNOSIS — R73.03 PREDIABETES: ICD-10-CM

## 2023-10-02 DIAGNOSIS — G47.33 OSA ON CPAP: ICD-10-CM

## 2023-10-02 DIAGNOSIS — E66.9 CLASS 1 OBESITY WITH SERIOUS COMORBIDITY AND BODY MASS INDEX (BMI) OF 30.0 TO 30.9 IN ADULT, UNSPECIFIED OBESITY TYPE: ICD-10-CM

## 2023-10-02 DIAGNOSIS — Z51.81 ENCOUNTER FOR THERAPEUTIC DRUG LEVEL MONITORING: ICD-10-CM

## 2023-10-02 DIAGNOSIS — E78.5 DYSLIPIDEMIA: ICD-10-CM

## 2023-10-02 DIAGNOSIS — I10 BENIGN ESSENTIAL HTN: ICD-10-CM

## 2023-10-02 DIAGNOSIS — E88.819 INSULIN RESISTANCE: ICD-10-CM

## 2023-10-02 DIAGNOSIS — E88.810 METABOLIC SYNDROME: ICD-10-CM

## 2023-10-02 LAB
EST. AVERAGE GLUCOSE BLD GHB EST-MCNC: 120 MG/DL (ref 68–126)
HBA1C MFR BLD: 5.8 % (ref ?–5.7)

## 2023-10-02 PROCEDURE — 83036 HEMOGLOBIN GLYCOSYLATED A1C: CPT

## 2023-10-02 PROCEDURE — 36415 COLL VENOUS BLD VENIPUNCTURE: CPT

## 2023-10-23 DIAGNOSIS — E78.49 OTHER HYPERLIPIDEMIA: ICD-10-CM

## 2023-10-23 RX ORDER — ATORVASTATIN CALCIUM 20 MG/1
20 TABLET, FILM COATED ORAL NIGHTLY
Qty: 90 TABLET | Refills: 3 | Status: SHIPPED | OUTPATIENT
Start: 2023-10-23

## 2023-11-03 DIAGNOSIS — F41.9 ANXIETY: ICD-10-CM

## 2023-11-03 NOTE — TELEPHONE ENCOUNTER
A refill request was received for:  Requested Prescriptions     Pending Prescriptions Disp Refills    ALPRAZolam 0.25 MG Oral Tab 30 tablet 0     Sig: Take 1 tablet (0.25 mg total) by mouth 2 (two) times daily as needed.        Last refill date: 07/25/23      Last office visit:02/13/23       Future Appointments   Date Time Provider Oralia Haywood   12/18/2023  3:00 PM Satish Huggins MD [facility]

## 2023-11-08 RX ORDER — ALPRAZOLAM 0.25 MG/1
0.25 TABLET ORAL 2 TIMES DAILY PRN
Qty: 30 TABLET | Refills: 0 | Status: SHIPPED | OUTPATIENT
Start: 2023-11-08

## 2023-12-22 DIAGNOSIS — F41.9 ANXIETY: ICD-10-CM

## 2023-12-26 NOTE — TELEPHONE ENCOUNTER
A refill request was received for:  Requested Prescriptions     Pending Prescriptions Disp Refills    ALPRAZolam 0.25 MG Oral Tab 30 tablet 0     Sig: Take 1 tablet (0.25 mg total) by mouth 2 (two) times daily as needed.        Last refill date: 11/8/2023    Last office visit: 2/13/2023    Follow up due:  Future Appointments   Date Time Provider Oralia Haywood   1/29/2024  9:00 AM Angelo Bermudez MD [facility]

## 2023-12-27 RX ORDER — ALPRAZOLAM 0.25 MG/1
0.25 TABLET ORAL 2 TIMES DAILY PRN
Qty: 30 TABLET | Refills: 0 | Status: SHIPPED | OUTPATIENT
Start: 2023-12-27

## 2024-01-08 ENCOUNTER — LAB ENCOUNTER (OUTPATIENT)
Dept: LAB | Age: 49
End: 2024-01-08
Attending: FAMILY MEDICINE
Payer: COMMERCIAL

## 2024-01-08 DIAGNOSIS — R73.09 ELEVATED HEMOGLOBIN A1C: ICD-10-CM

## 2024-01-08 DIAGNOSIS — Z00.00 ROUTINE GENERAL MEDICAL EXAMINATION AT A HEALTH CARE FACILITY: ICD-10-CM

## 2024-01-08 LAB
ALBUMIN SERPL-MCNC: 4.2 G/DL (ref 3.4–5)
ALBUMIN/GLOB SERPL: 1.2 {RATIO} (ref 1–2)
ALP LIVER SERPL-CCNC: 122 U/L
ALT SERPL-CCNC: 30 U/L
ANION GAP SERPL CALC-SCNC: 3 MMOL/L (ref 0–18)
AST SERPL-CCNC: 16 U/L (ref 15–37)
BASOPHILS # BLD AUTO: 0.07 X10(3) UL (ref 0–0.2)
BASOPHILS NFR BLD AUTO: 0.8 %
BILIRUB SERPL-MCNC: 0.6 MG/DL (ref 0.1–2)
BUN BLD-MCNC: 12 MG/DL (ref 9–23)
CALCIUM BLD-MCNC: 9.6 MG/DL (ref 8.5–10.1)
CHLORIDE SERPL-SCNC: 106 MMOL/L (ref 98–112)
CHOLEST SERPL-MCNC: 177 MG/DL (ref ?–200)
CO2 SERPL-SCNC: 30 MMOL/L (ref 21–32)
COMPLEXED PSA SERPL-MCNC: 0.76 NG/ML (ref ?–4)
CREAT BLD-MCNC: 1.17 MG/DL
EGFRCR SERPLBLD CKD-EPI 2021: 76 ML/MIN/1.73M2 (ref 60–?)
EOSINOPHIL # BLD AUTO: 1.28 X10(3) UL (ref 0–0.7)
EOSINOPHIL NFR BLD AUTO: 14 %
ERYTHROCYTE [DISTWIDTH] IN BLOOD BY AUTOMATED COUNT: 12.1 %
EST. AVERAGE GLUCOSE BLD GHB EST-MCNC: 126 MG/DL (ref 68–126)
FASTING PATIENT LIPID ANSWER: YES
FASTING STATUS PATIENT QL REPORTED: YES
GLOBULIN PLAS-MCNC: 3.4 G/DL (ref 2.8–4.4)
GLUCOSE BLD-MCNC: 104 MG/DL (ref 70–99)
HBA1C MFR BLD: 6 % (ref ?–5.7)
HCT VFR BLD AUTO: 45.2 %
HDLC SERPL-MCNC: 50 MG/DL (ref 40–59)
HGB BLD-MCNC: 14.8 G/DL
IMM GRANULOCYTES # BLD AUTO: 0.02 X10(3) UL (ref 0–1)
IMM GRANULOCYTES NFR BLD: 0.2 %
LDLC SERPL CALC-MCNC: 98 MG/DL (ref ?–100)
LYMPHOCYTES # BLD AUTO: 2.14 X10(3) UL (ref 1–4)
LYMPHOCYTES NFR BLD AUTO: 23.4 %
MCH RBC QN AUTO: 33.6 PG (ref 26–34)
MCHC RBC AUTO-ENTMCNC: 32.7 G/DL (ref 31–37)
MCV RBC AUTO: 102.7 FL
MONOCYTES # BLD AUTO: 0.8 X10(3) UL (ref 0.1–1)
MONOCYTES NFR BLD AUTO: 8.8 %
NEUTROPHILS # BLD AUTO: 4.82 X10 (3) UL (ref 1.5–7.7)
NEUTROPHILS # BLD AUTO: 4.82 X10(3) UL (ref 1.5–7.7)
NEUTROPHILS NFR BLD AUTO: 52.8 %
NONHDLC SERPL-MCNC: 127 MG/DL (ref ?–130)
OSMOLALITY SERPL CALC.SUM OF ELEC: 288 MOSM/KG (ref 275–295)
PLATELET # BLD AUTO: 255 10(3)UL (ref 150–450)
POTASSIUM SERPL-SCNC: 4.3 MMOL/L (ref 3.5–5.1)
PROT SERPL-MCNC: 7.6 G/DL (ref 6.4–8.2)
RBC # BLD AUTO: 4.4 X10(6)UL
SODIUM SERPL-SCNC: 139 MMOL/L (ref 136–145)
T4 FREE SERPL-MCNC: 0.8 NG/DL (ref 0.8–1.7)
TRIGL SERPL-MCNC: 165 MG/DL (ref 30–149)
TSI SER-ACNC: 5.43 MIU/ML (ref 0.36–3.74)
VIT B12 SERPL-MCNC: 370 PG/ML (ref 193–986)
VLDLC SERPL CALC-MCNC: 27 MG/DL (ref 0–30)
WBC # BLD AUTO: 9.1 X10(3) UL (ref 4–11)

## 2024-01-08 PROCEDURE — 80061 LIPID PANEL: CPT

## 2024-01-08 PROCEDURE — 85025 COMPLETE CBC W/AUTO DIFF WBC: CPT

## 2024-01-08 PROCEDURE — 82607 VITAMIN B-12: CPT

## 2024-01-08 PROCEDURE — 36415 COLL VENOUS BLD VENIPUNCTURE: CPT

## 2024-01-08 PROCEDURE — 83036 HEMOGLOBIN GLYCOSYLATED A1C: CPT

## 2024-01-08 PROCEDURE — 84439 ASSAY OF FREE THYROXINE: CPT

## 2024-01-08 PROCEDURE — 84443 ASSAY THYROID STIM HORMONE: CPT

## 2024-01-08 PROCEDURE — 80053 COMPREHEN METABOLIC PANEL: CPT

## 2024-01-30 NOTE — TELEPHONE ENCOUNTER
Requesting   Requested Prescriptions     Pending Prescriptions Disp Refills    METFORMIN  MG Oral Tablet 24 Hr [Pharmacy Med Name: METFORMIN ER 750MG 24HR TABS] 90 tablet 0     Sig: TAKE 1 TABLET(750 MG) BY MOUTH DAILY     LOV: 9/26/23  RTC: not noted  Filled: 11/6/23 #90 with 0 refills    Future Appointments   Date Time Provider Department Center   2/2/2024 10:00 AM Lisa Lopez DO [facility] [address]   3/8/2024  8:45 AM Arya Guajardo MD [facility]

## 2024-01-31 RX ORDER — METFORMIN HYDROCHLORIDE 750 MG/1
750 TABLET, EXTENDED RELEASE ORAL DAILY
Qty: 90 TABLET | Refills: 0 | Status: SHIPPED | OUTPATIENT
Start: 2024-01-31

## 2024-02-02 ENCOUNTER — OFFICE VISIT (OUTPATIENT)
Dept: FAMILY MEDICINE CLINIC | Facility: CLINIC | Age: 49
End: 2024-02-02
Payer: COMMERCIAL

## 2024-02-02 VITALS
SYSTOLIC BLOOD PRESSURE: 130 MMHG | OXYGEN SATURATION: 94 % | WEIGHT: 207 LBS | BODY MASS INDEX: 28.98 KG/M2 | HEIGHT: 71 IN | DIASTOLIC BLOOD PRESSURE: 86 MMHG | HEART RATE: 70 BPM

## 2024-02-02 DIAGNOSIS — Z00.00 ROUTINE GENERAL MEDICAL EXAMINATION AT A HEALTH CARE FACILITY: Primary | ICD-10-CM

## 2024-02-02 DIAGNOSIS — R79.89 ELEVATED TSH: ICD-10-CM

## 2024-02-02 DIAGNOSIS — F32.A ANXIETY AND DEPRESSION: ICD-10-CM

## 2024-02-02 DIAGNOSIS — F41.9 ANXIETY: ICD-10-CM

## 2024-02-02 DIAGNOSIS — Z23 NEED FOR VACCINATION: ICD-10-CM

## 2024-02-02 DIAGNOSIS — F41.9 ANXIETY AND DEPRESSION: ICD-10-CM

## 2024-02-02 PROCEDURE — 99396 PREV VISIT EST AGE 40-64: CPT | Performed by: FAMILY MEDICINE

## 2024-02-02 PROCEDURE — 3079F DIAST BP 80-89 MM HG: CPT | Performed by: FAMILY MEDICINE

## 2024-02-02 PROCEDURE — 3008F BODY MASS INDEX DOCD: CPT | Performed by: FAMILY MEDICINE

## 2024-02-02 PROCEDURE — 3075F SYST BP GE 130 - 139MM HG: CPT | Performed by: FAMILY MEDICINE

## 2024-02-02 PROCEDURE — 90471 IMMUNIZATION ADMIN: CPT | Performed by: FAMILY MEDICINE

## 2024-02-02 PROCEDURE — 90715 TDAP VACCINE 7 YRS/> IM: CPT | Performed by: FAMILY MEDICINE

## 2024-02-02 RX ORDER — BUDESONIDE AND FORMOTEROL FUMARATE DIHYDRATE 160; 4.5 UG/1; UG/1
AEROSOL RESPIRATORY (INHALATION)
COMMUNITY
Start: 2023-12-15

## 2024-02-02 RX ORDER — ALPRAZOLAM 0.25 MG/1
0.25 TABLET ORAL 2 TIMES DAILY PRN
Qty: 30 TABLET | Refills: 0 | Status: SHIPPED | OUTPATIENT
Start: 2024-02-09

## 2024-02-02 RX ORDER — ESCITALOPRAM OXALATE 10 MG/1
10 TABLET ORAL DAILY
Qty: 90 TABLET | Refills: 3 | Status: SHIPPED | OUTPATIENT
Start: 2024-02-02

## 2024-02-08 NOTE — H&P
Amado Marc is a 48 year old male who presents for a complete physical exam.   HPI:   Pt complains of     2. Elevated TSH.    3. Anxiety and depression    Current Outpatient Medications   Medication Sig Dispense Refill    SYMBICORT 160-4.5 MCG/ACT Inhalation Aerosol       escitalopram 10 MG Oral Tab Take 1 tablet (10 mg total) by mouth daily. 90 tablet 3    [START ON 2/9/2024] ALPRAZolam 0.25 MG Oral Tab Take 1 tablet (0.25 mg total) by mouth 2 (two) times daily as needed. 30 tablet 0    metFORMIN  MG Oral Tablet 24 Hr Take 1 tablet (750 mg total) by mouth daily. 90 tablet 0    triamcinolone 0.1 % External Ointment Apply 1 Application topically 2 (two) times daily for 60 doses. 454 g 1    atorvastatin 20 MG Oral Tab Take 1 tablet (20 mg total) by mouth nightly. 90 tablet 3    Halobetasol Propionate 0.05 % External Ointment Apply 50 g topically 2 (two) times daily. Apply a thin layer to affected area(s). 150 g 3    semaglutide (OZEMPIC, 0.25 OR 0.5 MG/DOSE,) 2 MG/3ML Subcutaneous Solution Pen-injector Begin with 0.25mg into skin weekly x 4 weeks, and then increase to 0.5mg into skin weekly 9 mL 0    albuterol 108 (90 Base) MCG/ACT Inhalation Aero Soln Inhale 2 puffs into the lungs every 6 (six) hours as needed for Wheezing. 8.5 g 0    LOSARTAN 50 MG Oral Tab TAKE 1 TABLET DAILY 90 tablet 3    aspirin 81 MG Oral Tab EC Take 1 tablet (81 mg total) by mouth daily. 30 tablet 5    Tadalafil 20 MG Oral Tab Take 1 tablet (20 mg total) by mouth daily as needed for Erectile Dysfunction. 30 tablet 5    ValACYclovir HCl 1 G Oral Tab Take 1 tablet (1,000 mg total) by mouth daily. 90 tablet 3    Clobetasol Propionate 0.05 % External Lotion APPLY TOPICALLY TO THE AFFECTED AREA TWICE DAILY 59 g 3      Past Medical History:   Diagnosis Date    Allergic rhinitis     Always had them since i was a kid.    Anxiety     Depression     On and off for years.    Genital HSV     High blood pressure     High cholesterol      Hyperlipidemia     Sleep apnea     Swveral years.    Unspecified essential hypertension       Past Surgical History:   Procedure Laterality Date    OTHER SURGICAL HISTORY  2014    anal fissure    REINSERT BI/TRICEPS TENDON,DISTAL Left 4/11/2016    Procedure: BICEPS TENDON REPAIR;  Surgeon: Lombardi, John A, MD;  Location: [facility], [facility]    VASECTOMY        Family History   Problem Relation Age of Onset    High Cholesterol Father     Diabetes Father     High Blood Pressure Mother     Cancer Mother     Hypertension Mother     Stroke Mother     Diabetes Paternal Grandmother     Diabetes Paternal Grandfather     Diabetes Sister     ADHD Daughter       Social History:  Social History     Socioeconomic History    Marital status:    Tobacco Use    Smoking status: Every Day     Packs/day: 1.00     Years: 30.00     Additional pack years: 0.00     Total pack years: 30.00     Types: Cigarettes     Last attempt to quit: 12/20/2020     Years since quitting: 3.1    Smokeless tobacco: Never   Substance and Sexual Activity    Alcohol use: Yes     Alcohol/week: 2.0 standard drinks of alcohol     Types: 2 Cans of beer per week     Comment: social    Drug use: No   Other Topics Concern    Caffeine Concern No    Stress Concern Yes    Weight Concern No    Special Diet No    Exercise Yes    Seat Belt Yes         REVIEW OF SYSTEMS:   GENERAL: feels well otherwise  SKIN: denies any unusual skin lesions  EYES:denies blurred vision or double vision  HEENT: denies nasal congestion, sinus pain or ST, denies changes in hearing or vision  LUNGS: denies shortness of breath with exertion or frequent cough  CV: denies chest pain, pressure or palpitations  GI: denies abdominal pain,denies heartburn, denies chronic diarrhea or constipation  : denies nocturia or changes in stream, denies erectile dysfunction  MS: denies back pain, arthralgias or myalgias  NEURO: denies headaches or dizziness  PSYCH: denies depression or  anxiety  HEMATOLOGIC: denies hx of anemia, easy bruising or bleeding  ENDOCRINE:denies frequent thirst or urination, denies unintentional weight gain/loss  ALL/ASTHMA: denies hx of allergy or asthma    EXAM:   /86   Pulse 70   Ht 5' 11\" (1.803 m)   Wt 207 lb (93.9 kg)   SpO2 94%   BMI 28.87 kg/m²   Body mass index is 28.87 kg/m².     GENERAL: well developed, well nourished,in no apparent distress  SKIN: no rashes,no suspicious lesions  HEENT: atraumatic, normocephalic, PERRLA, conjunctiva clear, TMs clear, posterior pharynx clear, no congestion  NECK: supple,no adenopathy,no thyromegaly  LUNGS: clear to auscultation, easy breathing  CV: S1S2, RRR without murmur  GI: good BS's;no masses, HSM or tenderness  : two descended testes,no scrotal tenderness or mass, normal penis no lesions, no hernia  MS: Marika, no bony deformities, gait normal  EXT: no cyanosis, clubbing or edema  NEURO: Oriented times three, strength 5/5 x 4 ext, LE DTRs 2+  PSYCH: mood and affect appropriate    ASSESSMENT AND PLAN:   Amado Marc is a 48 year old male who presents for a complete physical exam. Recommended heart healthy diet, routine exercise, and annual flu vaccines.  Routine testicular exams reinforced. The patient indicates understanding of these issues and agrees to the plan.  Follow up in 1 year.  1. Routine general medical examination at a health care facility    2. Elevated TSH  - TSH W Reflex To Free T4 [E]; Future    3. Anxiety and depression  - escitalopram 10 MG Oral Tab; Take 1 tablet (10 mg total) by mouth daily.  Dispense: 90 tablet; Refill: 3    4. Anxiety  - ALPRAZolam 0.25 MG Oral Tab; Take 1 tablet (0.25 mg total) by mouth 2 (two) times daily as needed.  Dispense: 30 tablet; Refill: 0    5. Need for vaccination  - TdaP (Adacel, Boostrix) [55934]    Orders Placed This Encounter   Procedures    TSH W Reflex To Free T4 [E]     Standing Status:   Future     Standing Expiration Date:   2/2/2025     Order Specific  Question:   Release to patient     Answer:   Immediate    TdaP (Adacel, Boostrix) [22691]

## 2024-02-19 ENCOUNTER — PATIENT MESSAGE (OUTPATIENT)
Dept: INTERNAL MEDICINE CLINIC | Facility: CLINIC | Age: 49
End: 2024-02-19

## 2024-02-19 NOTE — TELEPHONE ENCOUNTER
From: Amado Marc  To: Kayleen Nicholsenport  Sent: 2/19/2024 11:37 AM CST  Subject: Zepbound    Hi Kayleen,    I still haven’t been able to get the Wegovy since October and have been taking the Metformin, however my wife has been approved for the new Zepbound medication and was wondering if this is something that I can try to get approved for?

## 2024-02-22 RX ORDER — TIRZEPATIDE 2.5 MG/.5ML
2.5 INJECTION, SOLUTION SUBCUTANEOUS WEEKLY
Qty: 2 ML | Refills: 0 | Status: SHIPPED | OUTPATIENT
Start: 2024-02-22

## 2024-02-23 NOTE — TELEPHONE ENCOUNTER
I sent zepbound to pharmacy, however, pt needs to schedule an OV, we like to see pts at least every 3 months, and he hasn't been seen since 9/26/23 as his initial visit and it was telemed, needs to get scheduled for an in office visit for continuity of care

## 2024-02-26 ENCOUNTER — TELEPHONE (OUTPATIENT)
Dept: INTERNAL MEDICINE CLINIC | Facility: CLINIC | Age: 49
End: 2024-02-26

## 2024-02-26 NOTE — TELEPHONE ENCOUNTER
Got voice mail stating that pt need PA for Zepbound.  Pt also provide ph ([phone])  If we want to do a phone call PA.

## 2024-02-27 NOTE — TELEPHONE ENCOUNTER
Zepbound approved. Will notify patient via [employer]    Prior authorization approved Case ID: [id number]      Payer: [employer]    [phone]    [phone]   CaseId:[id number];Status:Approved;Review Type:Prior Auth;Coverage Start Date:01/28/2024;Coverage End Date:10/24/2024;   Approval Details    Authorized from January 28, 2024 to October 24, 2024

## 2024-02-27 NOTE — TELEPHONE ENCOUNTER
Prior authorization approved Case ID: [id number]      Payer: [employer]    [phone]    [phone]   CaseId:[id number];Status:Approved;Review Type:Prior Auth;Coverage Start Date:01/28/2024;Coverage End Date:10/24/2024;   Approval Details    Authorized from January 28, 2024 to October 24, 2024

## 2024-03-04 DIAGNOSIS — F41.9 ANXIETY: ICD-10-CM

## 2024-03-05 RX ORDER — ALPRAZOLAM 0.25 MG/1
0.25 TABLET ORAL 2 TIMES DAILY PRN
Qty: 30 TABLET | Refills: 0 | Status: SHIPPED | OUTPATIENT
Start: 2024-03-05

## 2024-03-05 NOTE — TELEPHONE ENCOUNTER
A refill request was received for:  Requested Prescriptions     Pending Prescriptions Disp Refills    ALPRAZolam 0.25 MG Oral Tab 30 tablet 0     Sig: Take 1 tablet (0.25 mg total) by mouth 2 (two) times daily as needed.       Last refill date:   2/9/2024    Last office visit: 2/2/2024    Follow up due:  Future Appointments   Date Time Provider Department Center   3/8/2024  8:45 AM Arya Guajardo MD [facility]   5/17/2024  2:00 PM Kayleen Beck, [facility] [address]

## 2024-03-18 RX ORDER — TIRZEPATIDE 2.5 MG/.5ML
2.5 INJECTION, SOLUTION SUBCUTANEOUS WEEKLY
Qty: 2 ML | Refills: 0 | Status: CANCELLED | OUTPATIENT
Start: 2024-03-18

## 2024-03-18 RX ORDER — TIRZEPATIDE 5 MG/.5ML
5 INJECTION, SOLUTION SUBCUTANEOUS WEEKLY
Qty: 2 ML | Refills: 0 | Status: SHIPPED | OUTPATIENT
Start: 2024-03-18

## 2024-03-18 NOTE — TELEPHONE ENCOUNTER
Requesting   Requested Prescriptions     Pending Prescriptions Disp Refills    Tirzepatide-Weight Management (ZEPBOUND) 2.5 MG/0.5ML Subcutaneous Solution Auto-injector 2 mL 0     Sig: Inject 2.5 mg into the skin once a week.      LOV: 9/26/23 - tele  RTC:   Last Relevant Labs:   Filled: 2/22/24 #2ml with 0 refills    Future Appointments   Date Time Provider Department Center   3/22/2024 11:00 AM Kayleen Beck PA-C [facility] [address]   4/22/2024  8:45 AM Arya Guajardo MD [facility]

## 2024-03-23 DIAGNOSIS — R06.2 WHEEZING: ICD-10-CM

## 2024-03-25 RX ORDER — ALBUTEROL SULFATE 90 UG/1
2 AEROSOL, METERED RESPIRATORY (INHALATION) EVERY 6 HOURS PRN
Qty: 8.5 G | Refills: 0 | Status: SHIPPED | OUTPATIENT
Start: 2024-03-25

## 2024-04-06 ENCOUNTER — LAB ENCOUNTER (OUTPATIENT)
Dept: LAB | Age: 49
End: 2024-04-06
Attending: FAMILY MEDICINE
Payer: COMMERCIAL

## 2024-04-06 DIAGNOSIS — R79.89 ELEVATED TSH: ICD-10-CM

## 2024-04-06 LAB — TSI SER-ACNC: 1.93 MIU/ML (ref 0.36–3.74)

## 2024-04-06 PROCEDURE — 36415 COLL VENOUS BLD VENIPUNCTURE: CPT

## 2024-04-06 PROCEDURE — 84443 ASSAY THYROID STIM HORMONE: CPT

## 2024-04-10 RX ORDER — TIRZEPATIDE 5 MG/.5ML
5 INJECTION, SOLUTION SUBCUTANEOUS WEEKLY
Qty: 2 ML | Refills: 0 | Status: CANCELLED | OUTPATIENT
Start: 2024-04-10

## 2024-04-10 NOTE — TELEPHONE ENCOUNTER
Requesting   Requested Prescriptions     Pending Prescriptions Disp Refills    Tirzepatide-Weight Management (ZEPBOUND) 7.5 MG/0.5ML Subcutaneous Solution Auto-injector 2 mL 0     Sig: Inject 7.5 mg into the skin once a week.      LOV: 9/26/23  RTC:   Last Relevant Labs:   Filled: 3/18/24 #2ml with 0 refills    Future Appointments   Date Time Provider Department Center   4/22/2024  8:45 AM Arya Guajardo MD [facility]   7/12/2024 11:00 AM Kayleen Beck PA-C [facility] [address]

## 2024-04-11 RX ORDER — TIRZEPATIDE 7.5 MG/.5ML
7.5 INJECTION, SOLUTION SUBCUTANEOUS WEEKLY
Qty: 2 ML | Refills: 0 | Status: SHIPPED | OUTPATIENT
Start: 2024-04-11

## 2024-04-21 DIAGNOSIS — A60.01 HERPES SIMPLEX INFECTION OF PENIS: ICD-10-CM

## 2024-04-21 DIAGNOSIS — R06.2 WHEEZING: ICD-10-CM

## 2024-04-21 DIAGNOSIS — F41.9 ANXIETY: ICD-10-CM

## 2024-04-22 RX ORDER — ALPRAZOLAM 0.25 MG/1
0.25 TABLET ORAL 2 TIMES DAILY PRN
Qty: 30 TABLET | Refills: 0 | Status: SHIPPED | OUTPATIENT
Start: 2024-04-22

## 2024-04-22 RX ORDER — VALACYCLOVIR HYDROCHLORIDE 1 G/1
1000 TABLET, FILM COATED ORAL DAILY
Qty: 90 TABLET | Refills: 3 | Status: SHIPPED | OUTPATIENT
Start: 2024-04-22

## 2024-04-22 RX ORDER — ALBUTEROL SULFATE 90 UG/1
2 AEROSOL, METERED RESPIRATORY (INHALATION) EVERY 6 HOURS PRN
Qty: 8.5 G | Refills: 0 | Status: SHIPPED | OUTPATIENT
Start: 2024-04-22

## 2024-04-22 NOTE — TELEPHONE ENCOUNTER
A refill request was received for:  Requested Prescriptions     Pending Prescriptions Disp Refills    ALPRAZolam 0.25 MG Oral Tab 30 tablet 0     Sig: Take 1 tablet (0.25 mg total) by mouth 2 (two) times daily as needed.       Last refill date:   3/5/2024    Last office visit: 2/2/2024    Follow up due:  Future Appointments   Date Time Provider Department Center   4/22/2024  8:45 AM Arya Guajardo MD [facility]   7/12/2024 11:00 AM Kayleen Beck, PAOscarC [facility] [address]

## 2024-05-06 RX ORDER — METFORMIN HYDROCHLORIDE 750 MG/1
750 TABLET, EXTENDED RELEASE ORAL DAILY
Qty: 90 TABLET | Refills: 0 | OUTPATIENT
Start: 2024-05-06

## 2024-05-06 NOTE — TELEPHONE ENCOUNTER
Requesting   Requested Prescriptions     Pending Prescriptions Disp Refills    METFORMIN  MG Oral Tablet 24 Hr [Pharmacy Med Name: METFORMIN ER 750MG 24HR TABS] 90 tablet 0     Sig: TAKE 1 TABLET(750 MG) BY MOUTH DAILY     LOV: 9/26/23  RTC: not noted  Filled: 1/23/24 #90 with 0 refills    Future Appointments   Date Time Provider Department Center   6/3/2024  8:15 AM Arya Guajardo MD [facility]   7/12/2024 11:00 AM Kayleen Beck, [facility] [address]     Need appt

## 2024-05-29 NOTE — TELEPHONE ENCOUNTER
Requesting   Requested Prescriptions     Pending Prescriptions Disp Refills    metFORMIN  MG Oral Tablet 24 Hr 90 tablet 0     Sig: Take 1 tablet (750 mg total) by mouth daily.      LOV: 9/26/23  RTC:   Last Relevant Labs:   Filled: 1/31/24 #90 with 0 refills    Future Appointments   Date Time Provider Department Center   6/3/2024  8:15 AM Arya Guajardo MD [facility]   7/12/2024 11:00 AM Kayleen Beck, PAOscarC [facility] [address]

## 2024-05-30 RX ORDER — METFORMIN HYDROCHLORIDE 750 MG/1
750 TABLET, EXTENDED RELEASE ORAL DAILY
Qty: 90 TABLET | Refills: 0 | Status: SHIPPED | OUTPATIENT
Start: 2024-05-30

## 2024-06-14 DIAGNOSIS — F41.9 ANXIETY: ICD-10-CM

## 2024-06-14 NOTE — TELEPHONE ENCOUNTER
A refill request was received for:  Requested Prescriptions     Pending Prescriptions Disp Refills    ALPRAZolam 0.25 MG Oral Tab 30 tablet 0     Sig: Take 1 tablet (0.25 mg total) by mouth 2 (two) times daily as needed.       Last refill date:   4-22-24    Last office visit: 2-2-24    Follow up due:  Future Appointments   Date Time Provider Department Center   7/12/2024  9:00 AM Arya Guajardo MD [facility]   7/12/2024 11:00 AM Kayleen Beck, PAOscarC [facility] [address]

## 2024-06-16 RX ORDER — ALPRAZOLAM 0.25 MG/1
0.25 TABLET ORAL 2 TIMES DAILY PRN
Qty: 30 TABLET | Refills: 0 | Status: SHIPPED | OUTPATIENT
Start: 2024-06-16

## 2024-06-24 RX ORDER — TIRZEPATIDE 7.5 MG/.5ML
7.5 INJECTION, SOLUTION SUBCUTANEOUS WEEKLY
Qty: 2 ML | Refills: 0 | Status: CANCELLED | OUTPATIENT
Start: 2024-06-24

## 2024-06-25 NOTE — TELEPHONE ENCOUNTER
Requesting   Requested Prescriptions     Pending Prescriptions Disp Refills    Tirzepatide-Weight Management (ZEPBOUND) 10 MG/0.5ML Subcutaneous Solution Auto-injector 2 mL 0     Sig: Inject 10 mg into the skin once a week for 4 doses.      LOV: 9/26/23  RTC:   Last Relevant Labs:   Filled: 4/11/24 #2ml with 0 refills    Future Appointments   Date Time Provider Department Center   7/12/2024  9:00 AM Arya Guajardo MD [facility]   7/12/2024 11:00 AM Kayleen Beck, PAOscarC [facility] [address]

## 2024-06-26 RX ORDER — TIRZEPATIDE 10 MG/.5ML
10 INJECTION, SOLUTION SUBCUTANEOUS WEEKLY
Qty: 2 ML | Refills: 0 | Status: SHIPPED | OUTPATIENT
Start: 2024-06-26 | End: 2024-07-18

## 2024-07-08 ENCOUNTER — PATIENT MESSAGE (OUTPATIENT)
Facility: CLINIC | Age: 49
End: 2024-07-08

## 2024-07-23 ENCOUNTER — TELEMEDICINE (OUTPATIENT)
Facility: CLINIC | Age: 49
End: 2024-07-23
Payer: COMMERCIAL

## 2024-07-23 DIAGNOSIS — R73.03 PREDIABETES: ICD-10-CM

## 2024-07-23 DIAGNOSIS — E66.9 CLASS 1 OBESITY WITH SERIOUS COMORBIDITY AND BODY MASS INDEX (BMI) OF 30.0 TO 30.9 IN ADULT, UNSPECIFIED OBESITY TYPE: ICD-10-CM

## 2024-07-23 DIAGNOSIS — Z51.81 ENCOUNTER FOR THERAPEUTIC DRUG LEVEL MONITORING: Primary | ICD-10-CM

## 2024-07-23 DIAGNOSIS — I10 BENIGN ESSENTIAL HTN: ICD-10-CM

## 2024-07-23 DIAGNOSIS — E78.5 DYSLIPIDEMIA: ICD-10-CM

## 2024-07-23 DIAGNOSIS — E88.819 INSULIN RESISTANCE: ICD-10-CM

## 2024-07-23 DIAGNOSIS — G47.33 OSA ON CPAP: ICD-10-CM

## 2024-07-23 PROCEDURE — 99213 OFFICE O/P EST LOW 20 MIN: CPT | Performed by: PHYSICIAN ASSISTANT

## 2024-07-23 RX ORDER — TIRZEPATIDE 12.5 MG/.5ML
12.5 INJECTION, SOLUTION SUBCUTANEOUS WEEKLY
Qty: 2 ML | Refills: 0 | Status: SHIPPED | OUTPATIENT
Start: 2024-07-23 | End: 2024-08-14

## 2024-07-23 RX ORDER — METFORMIN HYDROCHLORIDE 750 MG/1
750 TABLET, EXTENDED RELEASE ORAL DAILY
Qty: 90 TABLET | Refills: 0 | Status: SHIPPED | OUTPATIENT
Start: 2024-07-23

## 2024-07-23 NOTE — PROGRESS NOTES
This visit is conducted using Telemedicine with live, interactive video and audio.    Patient has been referred to the [facility] website at [email or web address] to review the yearly Consent to Treat document.    Patient understands and accepts financial responsibility for any deductible, co-insurance and/or co-pays associated with this service.      HISTORY OF PRESENT ILLNESS  Chief Complaint   Patient presents with    Weight Check       Amado Marc is a 49 year old male here for follow up in medical weight loss program.   187lbs  Pt is compliant on metformin and zepbound 10mg  Denies chest pain, shortness of breath, dizziness, blurred vision, headache, paresthesia, nausea/vomiting.   Was on vacation last week, all inclusive  Got down to 178lbs  Not snacking like before    Exercise/Activity: walking  Nutrition: 24 hour food log reviewed, eating regular meals, +protein  Stress: work, home life, trying to manage, currently on lexapro  Sleep: 7 hours/night         Wt Readings from Last 6 Encounters:   02/02/24 207 lb (93.9 kg)   09/26/23 210 lb (95.3 kg)   02/13/23 203 lb (92.1 kg)   09/16/22 211 lb (95.7 kg)   01/13/22 206 lb (93.4 kg)   11/08/21 206 lb (93.4 kg)            Breakfast Lunch Dinner Snacks Fluids              REVIEW OF SYSTEMS  GENERAL HEALTH: feels well otherwise, denied any fevers chills or night sweats   RESPIRATORY: denies shortness of breath   CARDIOVASCULAR: denies chest pain  GI: denies abdominal pain    EXAM  There were no vitals taken for this visit.  GENERAL: well developed, well nourished,in no apparent distress, A/O x3  SKIN: no rashes,no suspicious lesions on visible skin  HEENT: atraumatic, normocephalic  LUNGS: no increased effort or work with breathing   NEURO: speaking fluently and in clear sentences    Lab Results   Component Value Date    WBC 9.1 01/08/2024    RBC 4.40 01/08/2024    HGB 14.8 01/08/2024    HCT 45.2 01/08/2024    .7 (H) 01/08/2024    MCH 33.6 01/08/2024    [facility]  32.7 01/08/2024    RDW 12.1 01/08/2024    .0 01/08/2024     Lab Results   Component Value Date     (H) 01/08/2024    BUN 12 01/08/2024    BUNCREA 15.2 12/21/2020    CREATSERUM 1.17 01/08/2024    ANIONGAP 3 01/08/2024    GFR 84 05/01/2017    GFRNAA 97 11/13/2021    GFRAA 113 11/13/2021    CA 9.6 01/08/2024    OSMOCALC 288 01/08/2024    ALKPHO 122 (H) 01/08/2024    AST 16 01/08/2024    ALT 30 01/08/2024    BILT 0.6 01/08/2024    TP 7.6 01/08/2024    ALB 4.2 01/08/2024    GLOBULIN 3.4 01/08/2024    AGRATIO 2.6 (H) 11/25/2015     01/08/2024    K 4.3 01/08/2024     01/08/2024    CO2 30.0 01/08/2024     Lab Results   Component Value Date     01/08/2024    A1C 6.0 (H) 01/08/2024     Lab Results   Component Value Date    CHOLEST 177 01/08/2024    TRIG 165 (H) 01/08/2024    HDL 50 01/08/2024    LDL 98 01/08/2024    VLDL 27 01/08/2024    TCHDLRATIO 5.79 (H) 05/14/2018    NONHDLC 127 01/08/2024     Lab Results   Component Value Date    T4F 0.8 01/08/2024    TSH 1.930 04/06/2024     Lab Results   Component Value Date    B12 370 01/08/2024    VITB12 317 07/26/2013     Lab Results   Component Value Date    VITD 21.8 (L) 11/07/2020       Current Outpatient Medications on File Prior to Visit   Medication Sig Dispense Refill    ALPRAZolam 0.25 MG Oral Tab Take 1 tablet (0.25 mg total) by mouth 2 (two) times daily as needed. 30 tablet 0    metFORMIN  MG Oral Tablet 24 Hr Take 1 tablet (750 mg total) by mouth daily. 90 tablet 0    albuterol 108 (90 Base) MCG/ACT Inhalation Aero Soln Inhale 2 puffs into the lungs every 6 (six) hours as needed for Wheezing. 8.5 g 0    valACYclovir 1 G Oral Tab Take 1 tablet (1,000 mg total) by mouth daily. 90 tablet 3    Tirzepatide-Weight Management (ZEPBOUND) 7.5 MG/0.5ML Subcutaneous Solution Auto-injector Inject 7.5 mg into the skin once a week. 2 mL 0    Tirzepatide-Weight Management (ZEPBOUND) 5 MG/0.5ML Subcutaneous Solution Auto-injector Inject 5 mg into  the skin once a week. 2 mL 0    Tirzepatide-Weight Management (ZEPBOUND) 2.5 MG/0.5ML Subcutaneous Solution Auto-injector Inject 2.5 mg into the skin once a week. 2 mL 0    SYMBICORT 160-4.5 MCG/ACT Inhalation Aerosol       escitalopram 10 MG Oral Tab Take 1 tablet (10 mg total) by mouth daily. 90 tablet 3    atorvastatin 20 MG Oral Tab Take 1 tablet (20 mg total) by mouth nightly. 90 tablet 3    Halobetasol Propionate 0.05 % External Ointment Apply 50 g topically 2 (two) times daily. Apply a thin layer to affected area(s). 150 g 3    LOSARTAN 50 MG Oral Tab TAKE 1 TABLET DAILY 90 tablet 3    aspirin 81 MG Oral Tab EC Take 1 tablet (81 mg total) by mouth daily. 30 tablet 5    Tadalafil 20 MG Oral Tab Take 1 tablet (20 mg total) by mouth daily as needed for Erectile Dysfunction. 30 tablet 5    Clobetasol Propionate 0.05 % External Lotion APPLY TOPICALLY TO THE AFFECTED AREA TWICE DAILY 59 g 3     No current facility-administered medications on file prior to visit.       ASSESSMENT  Analyzed weight data:       Diagnoses and all orders for this visit:    Encounter for therapeutic drug level monitoring    Class 1 obesity with serious comorbidity and body mass index (BMI) of 30.0 to 30.9 in adult, unspecified obesity type    Prediabetes    Insulin resistance    Dyslipidemia    MARY LOU on CPAP    Benign essential HTN        PLAN  Initial Weight: 210lbs  Initial Weight Date: 9/26/23  Today's Weight: 187lbs  5% Goal: 10.5lbs  10% Goal: 21lbs  Total Weight Loss: Net loss 23lbs    Will continue metformin and increase zepbound - advised side effects and adverse effects of medication   Prediabetes/insulin resistance - continue current medication, low carb diet  HLD - stable on current medication atorvastatin, will continue, will continue to work on lifestyle modifications  HTN - blood pressure well controlled on current medication - advised signs and symptoms of hypotension and will monitor with continued weight loss, continue to  monitor at home  Had lab work done through [employer] [facility] and all his levels were within range  Consistency with logging foods  Incorporate strength/resistance training  Nutrition: low carb diet/ recommended to eat breakfast daily/ regular protein intake  Medication use and side effects reviewed with patient.  Medication contraindications: caution stimulant  Follow up with dietitian and psychologist as recommended.  Discussed the role of sleep and stress in weight management.  Counseled on comprehensive weight loss plan including attention to nutrition, exercise and behavior/stress management for success. See patient instruction below for more details.  Discussed strategies to overcome barriers to successful weight loss and weight maintenance  FITTE: ACSM recommendations (150-300 minutes/ week in active weight loss)   Weight Loss consent to treat reviewed and signed     There are no Patient Instructions on file for this visit.    No follow-ups on file.    Patient verbalizes understanding.    Kayleen Beck PA-C  7/23/2024    Please note that the following visit was completed using two-way, real-time interactive audio and video communication.  This has been done in good meseret to provide continuity of care in the best interest of the provider-patient relationship, due to the ongoing public health crisis/national emergency and because of restrictions of visitation.  There are limitations of this visit as no physical exam could be performed.  Every conscious effort was taken to allow for sufficient and adequate time.  This billing was spent on reviewing labs, medications, radiology tests and decision making.  Appropriate medical decision-making and tests are ordered as detailed in the plan of care above    Kayleen Beck PA-C

## 2024-07-29 DIAGNOSIS — I10 BENIGN ESSENTIAL HTN: ICD-10-CM

## 2024-07-29 RX ORDER — LOSARTAN POTASSIUM 50 MG/1
TABLET ORAL
Qty: 90 TABLET | Refills: 3 | Status: SHIPPED | OUTPATIENT
Start: 2024-07-29

## 2024-07-30 ENCOUNTER — TELEPHONE (OUTPATIENT)
Dept: INTERNAL MEDICINE CLINIC | Facility: CLINIC | Age: 49
End: 2024-07-30

## 2024-07-30 NOTE — TELEPHONE ENCOUNTER
Received fax from [facility]  PA is needed for zepbound 12.5mg  Continuation of therapy   Will try to initiate in Epic  CMM: [id number]

## 2024-08-08 ENCOUNTER — PATIENT MESSAGE (OUTPATIENT)
Facility: CLINIC | Age: 49
End: 2024-08-08

## 2024-08-08 DIAGNOSIS — E66.9 CLASS 1 OBESITY WITH SERIOUS COMORBIDITY AND BODY MASS INDEX (BMI) OF 30.0 TO 30.9 IN ADULT, UNSPECIFIED OBESITY TYPE: ICD-10-CM

## 2024-08-08 DIAGNOSIS — R73.03 PREDIABETES: ICD-10-CM

## 2024-08-08 DIAGNOSIS — G47.33 OSA ON CPAP: ICD-10-CM

## 2024-08-08 DIAGNOSIS — E88.810 METABOLIC SYNDROME: ICD-10-CM

## 2024-08-08 DIAGNOSIS — E78.5 DYSLIPIDEMIA: ICD-10-CM

## 2024-08-08 DIAGNOSIS — Z51.81 ENCOUNTER FOR THERAPEUTIC DRUG LEVEL MONITORING: Primary | ICD-10-CM

## 2024-08-08 DIAGNOSIS — I10 BENIGN ESSENTIAL HTN: ICD-10-CM

## 2024-08-08 DIAGNOSIS — E88.819 INSULIN RESISTANCE: ICD-10-CM

## 2024-08-08 NOTE — TELEPHONE ENCOUNTER
From: Amado Marc  To: Kayleen Beck  Sent: 8/8/2024 8:47 AM CDT  Subject: Medication Denial    Jordon Beyer,    After our visit last month you had prescribed me the Zepbound 12.5 prescription, I just found out that my insurance isn’t covering any strength of this medication and fully cut us off. Do we have any other options for this medication?

## 2024-08-30 DIAGNOSIS — F41.9 ANXIETY: ICD-10-CM

## 2024-08-30 DIAGNOSIS — R06.2 WHEEZING: ICD-10-CM

## 2024-09-03 DIAGNOSIS — R06.2 WHEEZING: ICD-10-CM

## 2024-09-03 DIAGNOSIS — R06.2 WHEEZING: Primary | ICD-10-CM

## 2024-09-03 DIAGNOSIS — F41.9 ANXIETY: ICD-10-CM

## 2024-09-03 NOTE — TELEPHONE ENCOUNTER
A refill request was received for:  Requested Prescriptions     Pending Prescriptions Disp Refills    albuterol 108 (90 Base) MCG/ACT Inhalation Aero Soln 8.5 g 0     Sig: Inhale 2 puffs into the lungs every 6 (six) hours as needed for Wheezing.       Last refill date:   4/22/2024    Last office visit: 2/2/2024    Follow up due:  Future Appointments   Date Time Provider Department Center   9/27/2024 11:00 AM Kayleen Beck PA-C [facility] [address]   10/11/2024  9:45 AM [facility]

## 2024-09-03 NOTE — TELEPHONE ENCOUNTER
We could increase his metformin dose    We could also add a medication like topamax, helps curb cravings for carbs and sweets, will make carbonated beverages taste flat, may cause some numbness or tingling, but it is typically transient

## 2024-09-03 NOTE — TELEPHONE ENCOUNTER
A refill request was received for:  Requested Prescriptions     Pending Prescriptions Disp Refills    ALPRAZolam 0.25 MG Oral Tab 30 tablet 0     Sig: Take 1 tablet (0.25 mg total) by mouth 2 (two) times daily as needed.       Last refill date:   6/16/2024    Last office visit: 2/2/2024    Follow up due:  Future Appointments   Date Time Provider Department Center   9/27/2024 11:00 AM Kayleen Beck PA-C [facility] [address]   10/11/2024  9:45 AM Arya Guajardo MD [facility]

## 2024-09-04 NOTE — TELEPHONE ENCOUNTER
A refill request was received for:  Requested Prescriptions     Pending Prescriptions Disp Refills    SYMBICORT 160-4.5 MCG/ACT Inhalation Aerosol  0       Last refill date:   12/15/2023    Last office visit: 2/2/2024    Follow up due:  Future Appointments   Date Time Provider Department Center   9/27/2024 11:00 AM Kayleen Beck PA-C [facility] [address]   10/11/2024  9:45 AM Arya Guajardo MD [facility]

## 2024-09-04 NOTE — TELEPHONE ENCOUNTER
A refill request was received for:  Requested Prescriptions     Pending Prescriptions Disp Refills    ALPRAZolam 0.25 MG Oral Tab 30 tablet 0     Sig: Take 1 tablet (0.25 mg total) by mouth 2 (two) times daily as needed.       Last refill date:   6/16/2024    Last office visit: 2/2/2024    Follow up due:  Future Appointments   Date Time Provider Department Center   9/27/2024 11:00 AM Kayleen Beck PA-C [facility] [address]   10/11/2024  9:45 AM Arya Guajardo MD [facility]

## 2024-09-04 NOTE — TELEPHONE ENCOUNTER
A refill request was received for:  Requested Prescriptions     Pending Prescriptions Disp Refills    albuterol 108 (90 Base) MCG/ACT Inhalation Aero Soln 8.5 g 0     Sig: Inhale 2 puffs into the lungs every 6 (six) hours as needed for Wheezing.       Last refill date:   4/22/2024    Last office visit: 2/2/2024    Follow up due:  Future Appointments   Date Time Provider Department Center   9/27/2024 11:00 AM Kayleen Beck PA-C [facility] [address]   10/11/2024  9:45 AM [facility]

## 2024-09-05 RX ORDER — BUDESONIDE AND FORMOTEROL FUMARATE DIHYDRATE 160; 4.5 UG/1; UG/1
2 AEROSOL RESPIRATORY (INHALATION) 2 TIMES DAILY
Qty: 1 EACH | Refills: 1 | Status: SHIPPED | OUTPATIENT
Start: 2024-09-05

## 2024-09-05 RX ORDER — ALBUTEROL SULFATE 90 UG/1
2 AEROSOL, METERED RESPIRATORY (INHALATION) EVERY 6 HOURS PRN
Qty: 8.5 G | Refills: 0 | Status: SHIPPED | OUTPATIENT
Start: 2024-09-05

## 2024-09-05 RX ORDER — ALPRAZOLAM 0.25 MG
0.25 TABLET ORAL 2 TIMES DAILY PRN
Qty: 30 TABLET | Refills: 0 | OUTPATIENT
Start: 2024-09-05

## 2024-09-05 RX ORDER — ALBUTEROL SULFATE 90 UG/1
2 INHALANT RESPIRATORY (INHALATION) EVERY 6 HOURS PRN
Qty: 8.5 G | Refills: 0 | OUTPATIENT
Start: 2024-09-05

## 2024-09-05 RX ORDER — ALPRAZOLAM 0.25 MG
0.25 TABLET ORAL 2 TIMES DAILY PRN
Qty: 30 TABLET | Refills: 0 | Status: SHIPPED | OUTPATIENT
Start: 2024-09-05

## 2024-09-06 RX ORDER — TOPIRAMATE 25 MG/1
25 TABLET, FILM COATED ORAL 2 TIMES DAILY
Qty: 180 TABLET | Refills: 0 | Status: SHIPPED | OUTPATIENT
Start: 2024-09-06

## 2024-09-13 DIAGNOSIS — R06.2 WHEEZING: ICD-10-CM

## 2024-09-16 NOTE — TELEPHONE ENCOUNTER
A refill request was received for:  Requested Prescriptions     Pending Prescriptions Disp Refills    albuterol 108 (90 Base) MCG/ACT Inhalation Aero Soln 8.5 g 0     Sig: Inhale 2 puffs into the lungs every 6 (six) hours as needed for Wheezing.       Last refill date:   9/5/2024    Last office visit: 2/2/2024    Patient comment:  I requested a 3 month refill and only got a 1 month refill, i sent this this through [facility] so i could get the 3 month supply but only got one but paid for a 3 thad supply and received a 1 month supply. Could you re submit this?     Follow up due:  Future Appointments   Date Time Provider Department Center   9/27/2024 11:00 AM Kayleen Beck, CHELI [facility] [address]   10/11/2024  9:45 AM Arya Guajardo MD [facility]

## 2024-09-17 RX ORDER — ALBUTEROL SULFATE 90 UG/1
2 INHALANT RESPIRATORY (INHALATION) EVERY 6 HOURS PRN
Qty: 8.5 G | Refills: 0 | Status: SHIPPED | OUTPATIENT
Start: 2024-09-17

## 2024-09-20 DIAGNOSIS — R06.2 WHEEZING: ICD-10-CM

## 2024-09-23 RX ORDER — ALBUTEROL SULFATE 90 UG/1
2 INHALANT RESPIRATORY (INHALATION) EVERY 6 HOURS PRN
Qty: 8.5 G | Refills: 0 | OUTPATIENT
Start: 2024-09-23

## 2024-09-27 ENCOUNTER — OFFICE VISIT (OUTPATIENT)
Facility: CLINIC | Age: 49
End: 2024-09-27
Payer: COMMERCIAL

## 2024-09-27 VITALS
DIASTOLIC BLOOD PRESSURE: 74 MMHG | RESPIRATION RATE: 18 BRPM | WEIGHT: 185 LBS | HEART RATE: 80 BPM | SYSTOLIC BLOOD PRESSURE: 118 MMHG | BODY MASS INDEX: 27.72 KG/M2 | HEIGHT: 68.31 IN

## 2024-09-27 DIAGNOSIS — E66.811 CLASS 1 OBESITY WITH SERIOUS COMORBIDITY AND BODY MASS INDEX (BMI) OF 30.0 TO 30.9 IN ADULT, UNSPECIFIED OBESITY TYPE: ICD-10-CM

## 2024-09-27 DIAGNOSIS — I10 BENIGN ESSENTIAL HTN: ICD-10-CM

## 2024-09-27 DIAGNOSIS — E78.5 DYSLIPIDEMIA: ICD-10-CM

## 2024-09-27 DIAGNOSIS — E88.819 INSULIN RESISTANCE: ICD-10-CM

## 2024-09-27 DIAGNOSIS — G47.33 OSA ON CPAP: ICD-10-CM

## 2024-09-27 DIAGNOSIS — R73.03 PREDIABETES: ICD-10-CM

## 2024-09-27 DIAGNOSIS — Z51.81 ENCOUNTER FOR THERAPEUTIC DRUG LEVEL MONITORING: ICD-10-CM

## 2024-09-27 DIAGNOSIS — E88.810 METABOLIC SYNDROME: ICD-10-CM

## 2024-09-27 RX ORDER — TOPIRAMATE 25 MG/1
25 TABLET, FILM COATED ORAL 2 TIMES DAILY
Qty: 180 TABLET | Refills: 0 | Status: SHIPPED | OUTPATIENT
Start: 2024-09-27

## 2024-09-27 RX ORDER — METFORMIN HYDROCHLORIDE 750 MG/1
1500 TABLET, EXTENDED RELEASE ORAL DAILY
Qty: 180 TABLET | Refills: 0 | Status: SHIPPED | OUTPATIENT
Start: 2024-09-27

## 2024-09-27 RX ORDER — MONTELUKAST SODIUM 10 MG/1
10 TABLET ORAL DAILY
COMMUNITY
Start: 2024-06-01

## 2024-09-27 RX ORDER — AZELASTINE HYDROCHLORIDE 137 UG/1
SPRAY, METERED NASAL
COMMUNITY
Start: 2024-06-01

## 2024-09-27 NOTE — PROGRESS NOTES
HISTORY OF PRESENT ILLNESS  Chief Complaint   Patient presents with    Weight Problem     Down 2 pounds        Amado Marc is a 49 year old male here for follow up in medical weight loss program.   -2lbs  Compliant on metformin and topamax - tried for about a week  Denies chest pain, shortness of breath, dizziness, blurred vision, headache, paresthesia, nausea/vomiting.   Insurance stop paying for zepbound  Last couple of weeks admits food choices have not been as good  Cut out a lot of noodles, bread, french fries    Exercise/Activity: admits minimal outside of work  Nutrition: 24 hour food log reviewed, eating regular meals, +protein  Stress: 6/10  Sleep: 6 hours/night     [facility] Follow Up    General Information  Success Moment: Lost 35 lbs  Challenging Moment: Gaing weight back  Nutrition Recall   Dining Out: 9   Exercise   Patient stated average level of stress: 6  Sleep   Patient stated # hours uninterrupted sleep: 6   Patient stated feels   restful: Yes      Goals: Lose more weight              Wt Readings from Last 6 Encounters:   09/27/24 185 lb (83.9 kg)   02/02/24 207 lb (93.9 kg)   09/26/23 210 lb (95.3 kg)   02/13/23 203 lb (92.1 kg)   09/16/22 211 lb (95.7 kg)   01/13/22 206 lb (93.4 kg)            Breakfast Lunch Dinner Snacks Fluids   Reviewed           REVIEW OF SYSTEMS  GENERAL HEALTH: feels well otherwise, denied any fevers chills or night sweats   RESPIRATORY: denies shortness of breath   CARDIOVASCULAR: denies chest pain  GI: denies abdominal pain    EXAM  /74   Pulse 80   Resp 18   Ht 5' 8.31\" (1.735 m)   Wt 185 lb (83.9 kg)   BMI 27.88 kg/m²   GENERAL: well developed, well nourished,in no apparent distress, A/O x3  SKIN: no rashes,no suspicious lesions  HEENT: atraumatic, normocephalic, OP-clear, PERRL  NECK: supple,no adenopathy  LUNGS: clear to auscultation bilaterally   CARDIO: RRR without murmur  GI: good BS's,NT/ND, no masses or HSM  EXTREMITIES: no cyanosis, no clubbing, no  edema    Lab Results   Component Value Date    WBC 9.1 01/08/2024    RBC 4.40 01/08/2024    HGB 14.8 01/08/2024    HCT 45.2 01/08/2024    .7 (H) 01/08/2024    MCH 33.6 01/08/2024    MCHC 32.7 01/08/2024    RDW 12.1 01/08/2024    .0 01/08/2024     Lab Results   Component Value Date     (H) 01/08/2024    BUN 12 01/08/2024    BUNCREA 15.2 12/21/2020    CREATSERUM 1.17 01/08/2024    ANIONGAP 3 01/08/2024    GFR 84 05/01/2017    GFRNAA 97 11/13/2021    GFRAA 113 11/13/2021    CA 9.6 01/08/2024    OSMOCALC 288 01/08/2024    ALKPHO 122 (H) 01/08/2024    AST 16 01/08/2024    ALT 30 01/08/2024    BILT 0.6 01/08/2024    TP 7.6 01/08/2024    ALB 4.2 01/08/2024    GLOBULIN 3.4 01/08/2024    AGRATIO 2.6 (H) 11/25/2015     01/08/2024    K 4.3 01/08/2024     01/08/2024    CO2 30.0 01/08/2024     Lab Results   Component Value Date     01/08/2024    A1C 6.0 (H) 01/08/2024     Lab Results   Component Value Date    CHOLEST 177 01/08/2024    TRIG 165 (H) 01/08/2024    HDL 50 01/08/2024    LDL 98 01/08/2024    VLDL 27 01/08/2024    TCHDLRATIO 5.79 (H) 05/14/2018    NONHDLC 127 01/08/2024     Lab Results   Component Value Date    T4F 0.8 01/08/2024    TSH 1.930 04/06/2024     Lab Results   Component Value Date    B12 370 01/08/2024    VITB12 317 07/26/2013     Lab Results   Component Value Date    VITD 21.8 (L) 11/07/2020       Current Outpatient Medications on File Prior to Visit   Medication Sig Dispense Refill    azelastine 137 MCG/SPRAY Nasal Solution INSTILL 2 SPRAYS INTO EACH NOSTRIL TWICE DAILY OR AS NEEDED FOR NASAL SYMPTOMS.      montelukast 10 MG Oral Tab Take 1 tablet (10 mg total) by mouth daily.      albuterol 108 (90 Base) MCG/ACT Inhalation Aero Soln Inhale 2 puffs into the lungs every 6 (six) hours as needed for Wheezing. 8.5 g 0    ALPRAZolam 0.25 MG Oral Tab Take 1 tablet (0.25 mg total) by mouth 2 (two) times daily as needed. 30 tablet 0    SYMBICORT 160-4.5 MCG/ACT Inhalation  Aerosol Inhale 2 puffs into the lungs 2 (two) times daily. 1 each 1    Clobetasol Propionate 0.05 % External Lotion APPLY TOPICALLY TO THE AFFECTED AREA TWICE DAILY 59 mL 3    LOSARTAN 50 MG Oral Tab TAKE 1 TABLET DAILY 90 tablet 3    metFORMIN  MG Oral Tablet 24 Hr Take 1 tablet (750 mg total) by mouth daily. 90 tablet 0    valACYclovir 1 G Oral Tab Take 1 tablet (1,000 mg total) by mouth daily. 90 tablet 3    escitalopram 10 MG Oral Tab Take 1 tablet (10 mg total) by mouth daily. 90 tablet 3    atorvastatin 20 MG Oral Tab Take 1 tablet (20 mg total) by mouth nightly. 90 tablet 3    Halobetasol Propionate 0.05 % External Ointment Apply 50 g topically 2 (two) times daily. Apply a thin layer to affected area(s). 150 g 3    aspirin 81 MG Oral Tab EC Take 1 tablet (81 mg total) by mouth daily. 30 tablet 5    Tadalafil 20 MG Oral Tab Take 1 tablet (20 mg total) by mouth daily as needed for Erectile Dysfunction. 30 tablet 5     No current facility-administered medications on file prior to visit.       ASSESSMENT  Analyzed weight data:       Diagnoses and all orders for this visit:    Encounter for therapeutic drug level monitoring  -     topiramate 25 MG Oral Tab; Take 1 tablet (25 mg total) by mouth 2 (two) times daily.    Class 1 obesity with serious comorbidity and body mass index (BMI) of 30.0 to 30.9 in adult, unspecified obesity type  -     topiramate 25 MG Oral Tab; Take 1 tablet (25 mg total) by mouth 2 (two) times daily.    Prediabetes  -     topiramate 25 MG Oral Tab; Take 1 tablet (25 mg total) by mouth 2 (two) times daily.    Insulin resistance  -     topiramate 25 MG Oral Tab; Take 1 tablet (25 mg total) by mouth 2 (two) times daily.    Dyslipidemia  -     topiramate 25 MG Oral Tab; Take 1 tablet (25 mg total) by mouth 2 (two) times daily.    MARY LOU on CPAP  -     topiramate 25 MG Oral Tab; Take 1 tablet (25 mg total) by mouth 2 (two) times daily.    Benign essential HTN  -     topiramate 25 MG Oral Tab;  Take 1 tablet (25 mg total) by mouth 2 (two) times daily.    Metabolic syndrome  -     topiramate 25 MG Oral Tab; Take 1 tablet (25 mg total) by mouth 2 (two) times daily.    Other orders  -     metFORMIN  MG Oral Tablet 24 Hr; Take 2 tablets (1,500 mg total) by mouth daily.        PLAN  Initial Weight: 210lbs  Initial Weight Date: 9/26/23  Today's Weight: 185lbs  5% Goal: 10.5lbs  10% Goal: 21lbs  Total Weight Loss: -2lbs/Net loss 25lbs    Will continue metformin and topamax - advised side effects and adverse effects of medication   Prediabetes/Insulin resistance - continue current medications, low carb diet  HLD - stable on current medication atorvastatin, will continue, will continue to work on lifestyle modifications  HTN - blood pressure well controlled on current medication - advised signs and symptoms of hypotension and will monitor with continued weight loss  CPAP compliance  Consistency with logging foods - protein and produce  Increase movement  Nutrition: low carb diet/ recommended to eat breakfast daily/ regular protein intake  Medication use and side effects reviewed with patient.  Medication contraindications: caution stimulant  Follow up with dietitian and psychologist as recommended.  Discussed the role of sleep and stress in weight management.  Counseled on comprehensive weight loss plan including attention to nutrition, exercise and behavior/stress management for success. See patient instruction below for more details.  Discussed strategies to overcome barriers to successful weight loss and weight maintenance  FITTE: ACSM recommendations (150-300 minutes/ week in active weight loss)   Weight Loss consent to treat reviewed and signed       NOTE TO PATIENT: The 21st Century Cures Act makes clinical notes like these available to patients in the interest of transparency. Clinical notes are medical documents used by physicians and care providers to communicate with each other. These documents  include medical language and terminology, abbreviations, and treatment information that may sound technical and at times possibly unfamiliar. In addition, at times, the verbiage may appear blunt or direct. These documents are one tool providers use to communicate relevant information and clinical opinions of the care providers in a way that allows common understanding of the clinical context.     There are no Patient Instructions on file for this visit.    No follow-ups on file.    Patient verbalizes understanding.    Kayleen Beck PA-C  9/27/2024

## 2024-11-01 ENCOUNTER — LAB ENCOUNTER (OUTPATIENT)
Dept: LAB | Age: 49
End: 2024-11-01
Attending: INTERNAL MEDICINE
Payer: COMMERCIAL

## 2024-11-01 DIAGNOSIS — J45.50 SEVERE PERSISTENT ALLERGIC ASTHMA (HCC): ICD-10-CM

## 2024-11-01 LAB
BASOPHILS # BLD AUTO: 0.1 X10(3) UL (ref 0–0.2)
BASOPHILS NFR BLD AUTO: 1 %
EOSINOPHIL # BLD AUTO: 1.31 X10(3) UL (ref 0–0.7)
EOSINOPHIL NFR BLD AUTO: 13.1 %
ERYTHROCYTE [DISTWIDTH] IN BLOOD BY AUTOMATED COUNT: 12.9 %
HCT VFR BLD AUTO: 41.2 %
HGB BLD-MCNC: 14.7 G/DL
IMM GRANULOCYTES # BLD AUTO: 0.03 X10(3) UL (ref 0–1)
IMM GRANULOCYTES NFR BLD: 0.3 %
LYMPHOCYTES # BLD AUTO: 1.97 X10(3) UL (ref 1–4)
LYMPHOCYTES NFR BLD AUTO: 19.7 %
MCH RBC QN AUTO: 35.5 PG (ref 26–34)
MCHC RBC AUTO-ENTMCNC: 35.7 G/DL (ref 31–37)
MCV RBC AUTO: 99.5 FL
MONOCYTES # BLD AUTO: 0.84 X10(3) UL (ref 0.1–1)
MONOCYTES NFR BLD AUTO: 8.4 %
NEUTROPHILS # BLD AUTO: 5.75 X10 (3) UL (ref 1.5–7.7)
NEUTROPHILS # BLD AUTO: 5.75 X10(3) UL (ref 1.5–7.7)
NEUTROPHILS NFR BLD AUTO: 57.5 %
PLATELET # BLD AUTO: 210 10(3)UL (ref 150–450)
RBC # BLD AUTO: 4.14 X10(6)UL
WBC # BLD AUTO: 10 X10(3) UL (ref 4–11)

## 2024-11-01 PROCEDURE — 82785 ASSAY OF IGE: CPT | Performed by: INTERNAL MEDICINE

## 2024-11-01 PROCEDURE — 85025 COMPLETE CBC W/AUTO DIFF WBC: CPT | Performed by: INTERNAL MEDICINE

## 2024-11-01 PROCEDURE — 86003 ALLG SPEC IGE CRUDE XTRC EA: CPT | Performed by: INTERNAL MEDICINE

## 2024-11-04 LAB
A ALTERNATA IGE QN: 1.36 KUA/L (ref ?–0.1)
A FUMIGATUS IGE QN: <0.1 KUA/L (ref ?–0.1)
AMER SYCAMORE IGE QN: <0.1 KUA/L (ref ?–0.1)
BERMUDA GRASS IGE QN: <0.1 KUA/L (ref ?–0.1)
BOXELDER IGE QN: <0.1 KUA/L (ref ?–0.1)
C HERBARUM IGE QN: <0.1 KUA/L (ref ?–0.1)
CALIF WALNUT IGE QN: <0.1 KUA/L (ref ?–0.1)
CAT DANDER IGE QN: 0.46 KUA/L (ref ?–0.1)
CMN PIGWEED IGE QN: <0.1 KUA/L (ref ?–0.1)
COMMON RAGWEED IGE QN: <0.1 KUA/L (ref ?–0.1)
COTTONWOOD IGE QN: <0.1 KUA/L (ref ?–0.1)
D FARINAE IGE QN: 0.29 KUA/L (ref ?–0.1)
D PTERONYSS IGE QN: 0.33 KUA/L (ref ?–0.1)
DOG DANDER IGE QN: 1.66 KUA/L (ref ?–0.1)
IGE SERPL-ACNC: 761 KU/L (ref 2–214)
M RACEMOSUS IGE QN: <0.1 KUA/L (ref ?–0.1)
MARSH ELDER IGE QN: 0.11 KUA/L (ref ?–0.1)
MOUSE EPITH IGE QN: 0.7 KUA/L (ref ?–0.1)
MT JUNIPER IGE QN: 0.11 KUA/L (ref ?–0.1)
P NOTATUM IGE QN: <0.1 KUA/L (ref ?–0.1)
PECAN/HICK TREE IGE QN: <0.1 KUA/L (ref ?–0.1)
ROACH IGE QN: 0.12 KUA/L (ref ?–0.1)
SALTWORT IGE QN: 0.63 KUA/L (ref ?–0.1)
SILVER BIRCH IGE QN: <0.1 KUA/L (ref ?–0.1)
TIMOTHY IGE QN: <0.1 KUA/L (ref ?–0.1)
WHITE ASH IGE QN: 1.26 KUA/L (ref ?–0.1)
WHITE ELM IGE QN: <0.1 KUA/L (ref ?–0.1)
WHITE MULBERRY IGE QN: <0.1 KUA/L (ref ?–0.1)
WHITE OAK IGE QN: <0.1 KUA/L (ref ?–0.1)

## 2024-11-05 LAB
ALLERGEN BRAZIL NUT: <0.1 KUA/L (ref ?–0.1)
ALMOND IGE QN: <0.1 KUA/L (ref ?–0.1)
CASHEW NUT IGE QN: 0.19 KUA/L (ref ?–0.1)
CLAM IGE QN: 0.77 KUA/L (ref ?–0.1)
CODFISH IGE QN: 1.1 KUA/L (ref ?–0.1)
CORN IGE QN: <0.1 KUA/L (ref ?–0.1)
COW MILK IGE QN: 0.16 KUA/L (ref ?–0.1)
EGG WHITE IGE QN: <0.1 KUA/L (ref ?–0.1)
GLUTEN IGE QN: <0.1 KUA/L (ref ?–0.1)
HAZELNUT IGE QN: <0.1 KUA/L (ref ?–0.1)
IGE SERPL-ACNC: 711 KU/L (ref 2–214)
PEANUT IGE QN: <0.1 KUA/L (ref ?–0.1)
SALMON IGE QN: 0.17 KUA/L (ref ?–0.1)
SCALLOP IGE QN: <0.1 KUA/L (ref ?–0.1)
SESAME SEED IGE QN: <0.1 KUA/L (ref ?–0.1)
SHRIMP IGE QN: 0.17 KUA/L (ref ?–0.1)
SOYBEAN IGE QN: <0.1 KUA/L (ref ?–0.1)
WALNUT IGE QN: <0.1 KUA/L (ref ?–0.1)
WHEAT IGE QN: <0.1 KUA/L (ref ?–0.1)

## 2024-11-15 ENCOUNTER — PATIENT MESSAGE (OUTPATIENT)
Age: 49
End: 2024-11-15

## 2024-11-26 ENCOUNTER — OFFICE VISIT (OUTPATIENT)
Dept: FAMILY MEDICINE CLINIC | Facility: CLINIC | Age: 49
End: 2024-11-26
Payer: COMMERCIAL

## 2024-11-26 VITALS
HEART RATE: 68 BPM | DIASTOLIC BLOOD PRESSURE: 78 MMHG | SYSTOLIC BLOOD PRESSURE: 106 MMHG | HEIGHT: 70 IN | BODY MASS INDEX: 26.92 KG/M2 | WEIGHT: 188 LBS | RESPIRATION RATE: 16 BRPM

## 2024-11-26 DIAGNOSIS — R06.2 WHEEZING: ICD-10-CM

## 2024-11-26 DIAGNOSIS — R73.03 PREDIABETES: Primary | ICD-10-CM

## 2024-11-26 DIAGNOSIS — F41.9 ANXIETY: ICD-10-CM

## 2024-11-26 DIAGNOSIS — I10 PRIMARY HYPERTENSION: ICD-10-CM

## 2024-11-26 LAB — HEMOGLOBIN A1C: 5.5 % (ref 4.3–5.6)

## 2024-11-26 PROCEDURE — 3074F SYST BP LT 130 MM HG: CPT | Performed by: FAMILY MEDICINE

## 2024-11-26 PROCEDURE — 3008F BODY MASS INDEX DOCD: CPT | Performed by: FAMILY MEDICINE

## 2024-11-26 PROCEDURE — 99214 OFFICE O/P EST MOD 30 MIN: CPT | Performed by: FAMILY MEDICINE

## 2024-11-26 PROCEDURE — 3078F DIAST BP <80 MM HG: CPT | Performed by: FAMILY MEDICINE

## 2024-11-26 PROCEDURE — 83036 HEMOGLOBIN GLYCOSYLATED A1C: CPT | Performed by: FAMILY MEDICINE

## 2024-11-26 RX ORDER — LOSARTAN POTASSIUM 25 MG/1
25 TABLET ORAL DAILY
Qty: 90 TABLET | Refills: 2 | Status: SHIPPED | OUTPATIENT
Start: 2024-11-26

## 2024-11-26 RX ORDER — ALBUTEROL SULFATE 90 UG/1
2 INHALANT RESPIRATORY (INHALATION) EVERY 6 HOURS PRN
Qty: 3 EACH | Refills: 2 | Status: SHIPPED | OUTPATIENT
Start: 2024-11-26

## 2024-11-26 RX ORDER — ALPRAZOLAM 0.25 MG/1
0.25 TABLET ORAL 2 TIMES DAILY PRN
Qty: 30 TABLET | Refills: 0 | Status: SHIPPED | OUTPATIENT
Start: 2024-11-26

## 2024-11-26 NOTE — PROGRESS NOTES
Subjective:   Patient ID: Amado Marc is a 49 year old male.    1. Anxiety. Chronic.  Intermittently using xanax.  About twice a week.    2. Prediabetes.  Last A1c value was 5.5% done 11/26/2024.     3. Primary hypertension.  HTN.  Chronic.  No CP/SOB.  No symptoms.  No side effects    4. Wheezing.  No improvement.      History/Other:   Review of Systems   All other systems reviewed and are negative.    Current Outpatient Medications   Medication Sig Dispense Refill    losartan 25 MG Oral Tab Take 1 tablet (25 mg total) by mouth daily. 90 tablet 2    ALPRAZolam 0.25 MG Oral Tab Take 1 tablet (0.25 mg total) by mouth 2 (two) times daily as needed. 30 tablet 0    albuterol 108 (90 Base) MCG/ACT Inhalation Aero Soln Inhale 2 puffs into the lungs every 6 (six) hours as needed for Wheezing. 3 each 2    budeson-glycopyrrol-formoterol (BREZTRI AEROSPHERE) 160-9-4.8 MCG/ACT Inhalation Aerosol Inhale 2 puffs into the lungs 2 (two) times daily. 10.7 g 5    varenicline 0.5 MG Oral Tab Take 1 tablet (0.5 mg total) by mouth 2 (two) times daily. 60 tablet 0    Halobetasol Propionate 0.05 % External Ointment Apply 50 g topically 2 (two) times daily. Apply a thin layer to affected area(s). 150 g 3    azelastine 137 MCG/SPRAY Nasal Solution INSTILL 2 SPRAYS INTO EACH NOSTRIL TWICE DAILY OR AS NEEDED FOR NASAL SYMPTOMS.      montelukast 10 MG Oral Tab Take 1 tablet (10 mg total) by mouth daily.      metFORMIN  MG Oral Tablet 24 Hr Take 2 tablets (1,500 mg total) by mouth daily. 180 tablet 0    topiramate 25 MG Oral Tab Take 1 tablet (25 mg total) by mouth 2 (two) times daily. 180 tablet 0    Clobetasol Propionate 0.05 % External Lotion APPLY TOPICALLY TO THE AFFECTED AREA TWICE DAILY 59 mL 3    valACYclovir 1 G Oral Tab Take 1 tablet (1,000 mg total) by mouth daily. 90 tablet 3    atorvastatin 20 MG Oral Tab Take 1 tablet (20 mg total) by mouth nightly. 90 tablet 3    aspirin 81 MG Oral Tab EC Take 1 tablet (81 mg total) by  mouth daily. 30 tablet 5    Tadalafil 20 MG Oral Tab Take 1 tablet (20 mg total) by mouth daily as needed for Erectile Dysfunction. 30 tablet 5    SYMBICORT 160-4.5 MCG/ACT Inhalation Aerosol Inhale 2 puffs into the lungs 2 (two) times daily. (Patient not taking: Reported on 11/26/2024) 1 each 1     Allergies:Allergies[1]    Objective:   Physical Exam  Vitals reviewed.   Constitutional:       General: He is not in acute distress.     Appearance: He is well-developed. He is not diaphoretic.   Eyes:      General: No scleral icterus.        Right eye: No discharge.         Left eye: No discharge.      Conjunctiva/sclera: Conjunctivae normal.   Cardiovascular:      Rate and Rhythm: Normal rate and regular rhythm.      Heart sounds: Normal heart sounds.   Pulmonary:      Effort: Pulmonary effort is normal. No respiratory distress.      Breath sounds: Normal breath sounds. No wheezing or rales.       Assessment & Plan:   1. Prediabetes    2. Anxiety    3. Primary hypertension    4. Wheezing      1. Anxiety  - ALPRAZolam 0.25 MG Oral Tab; Take 1 tablet (0.25 mg total) by mouth 2 (two) times daily as needed.  Dispense: 30 tablet; Refill: 0    2. Prediabetes  - Hemoglobin A1C; Future  - POC Hemoglobin A1C    3. Primary hypertension  - losartan 25 MG Oral Tab; Take 1 tablet (25 mg total) by mouth daily.  Dispense: 90 tablet; Refill: 2    4. Wheezing  - albuterol 108 (90 Base) MCG/ACT Inhalation Aero Soln; Inhale 2 puffs into the lungs every 6 (six) hours as needed for Wheezing.  Dispense: 3 each; Refill: 2    Orders Placed This Encounter   Procedures    Hemoglobin A1C    POC Hemoglobin A1C       Meds This Visit:  Requested Prescriptions     Signed Prescriptions Disp Refills    losartan 25 MG Oral Tab 90 tablet 2     Sig: Take 1 tablet (25 mg total) by mouth daily.    ALPRAZolam 0.25 MG Oral Tab 30 tablet 0     Sig: Take 1 tablet (0.25 mg total) by mouth 2 (two) times daily as needed.    albuterol 108 (90 Base) MCG/ACT  Inhalation Aero Soln 3 each 2     Sig: Inhale 2 puffs into the lungs every 6 (six) hours as needed for Wheezing.       Imaging & Referrals:  None         [1]   Allergies  Allergen Reactions    Dog Epithelium ITCHING and SHORTNESS OF BREATH    Rabbit Epithelium Allergy Skin Test Coughing, HIVES, ITCHING, RASH and SHORTNESS OF BREATH    Dust Mites     Mold

## 2024-11-29 DIAGNOSIS — F17.210 CIGARETTE SMOKER: ICD-10-CM

## 2024-11-29 RX ORDER — VARENICLINE TARTRATE 0.5 MG/1
0.5 TABLET, FILM COATED ORAL 2 TIMES DAILY
Qty: 56 TABLET | Refills: 1 | Status: SHIPPED | OUTPATIENT
Start: 2024-11-29

## 2024-11-29 NOTE — TELEPHONE ENCOUNTER
Received request for:Varenicline 0.5 mg    Patient last seen by: 11/01/24    Has scheduled appointment: 12/09/24    Physician recommendation:  Per Dr. Fish patient has tolerated Chantix in the past, so we will restart it.  Refill sent.   
93

## 2024-12-09 ENCOUNTER — OFFICE VISIT (OUTPATIENT)
Age: 49
End: 2024-12-09
Payer: COMMERCIAL

## 2024-12-09 VITALS
BODY MASS INDEX: 26.92 KG/M2 | RESPIRATION RATE: 16 BRPM | HEART RATE: 84 BPM | OXYGEN SATURATION: 91 % | DIASTOLIC BLOOD PRESSURE: 68 MMHG | SYSTOLIC BLOOD PRESSURE: 112 MMHG | HEIGHT: 70 IN | WEIGHT: 188 LBS

## 2024-12-09 DIAGNOSIS — J45.50 SEVERE PERSISTENT ALLERGIC ASTHMA (HCC): Primary | ICD-10-CM

## 2024-12-09 DIAGNOSIS — F17.210 CIGARETTE SMOKER: ICD-10-CM

## 2024-12-09 NOTE — PROGRESS NOTES
[facility] General Pulmonary Progress Note    History of Present Illness:  mAado Marc is a 49 year old male who presents today for follow up of labs.  Using Breztri twice a day, now starting Breztri not needing to use albuterol as often, using as needed once to twice a week. Still has rabbits, not able to remove from home at this time. Denies acute concerns. Reports coughing, worse at night, less with decrease in smoking. Has a cpap machine and reports dry cough, which he thinks is contributing to his dry cough. Reports SOB ans wheezing when triggered by allergens, especially rabbits. Denies chest pain/pressure, no fevers, chills, fatigue. Smoking about less then a pack a day. Has PFT scheduled. Started taking varenicline once week ago. Aware of side effects of medication, denies any vivid dreams, depression, or thoughts of S.I.     Previously 11/2024 Dr Fish  Amado Marc is a 49 year old male who presents today for evaluation of shortness of breath/dyspnea on exertion.  Patient carries a longstanding history of asthma, which is most mostly controlled with Symbicort.  Patient has a significant history of allergies, he gets weekly allergy shots.  Once such allergy that he has but cannot get rid of his 2 rabbits.  Patient does get winded with ambulation and fairly minimal exertion.  Patient is a current smoker.  Does get frequent rashes/eczema.     Past Medical History:   Past Medical History:    ALCOHOL USE    Allergic rhinitis    Always had them since i was a kid.    Anxiety    Depression    On and off for years.    Genital HSV    High blood pressure    High cholesterol    Hyperlipidemia    Obesity    Sleep apnea    Swveral years.    Unspecified essential hypertension        Past Surgical History:   Past Surgical History:   Procedure Laterality Date    Colonoscopy      Other surgical history  01/01/2014    anal fissure    Reinsert bi/triceps tendon,distal Left 04/11/2016    Procedure: BICEPS TENDON REPAIR;  Surgeon:  Lombardi, John A, MD;  Location: [facility]    Vasectomy         Family Medical History:   Family History   Problem Relation Age of Onset    High Cholesterol Father     Diabetes Father     High Blood Pressure Mother     Cancer Mother     Hypertension Mother     Stroke Mother     Diabetes Paternal Grandmother     Diabetes Paternal Grandfather     Diabetes Sister     ADHD Daughter         Social History:   Social History     Socioeconomic History    Marital status:      Spouse name: Not on file    Number of children: Not on file    Years of education: Not on file    Highest education level: Not on file   Occupational History    Not on file   Tobacco Use    Smoking status: Every Day     Current packs/day: 0.00     Average packs/day: 1 pack/day for 30.0 years (30.0 ttl pk-yrs)     Types: Cigarettes     Start date: 12/20/1990     Last attempt to quit: 12/20/2020     Years since quitting: 3.9    Smokeless tobacco: Never   Substance and Sexual Activity    Alcohol use: Yes     Alcohol/week: 8.0 standard drinks of alcohol     Types: 2 Cans of beer, 6 Standard drinks or equivalent per week     Comment: social    Drug use: No    Sexual activity: Not on file   Other Topics Concern     Service Not Asked    Blood Transfusions Not Asked    Caffeine Concern No    Occupational Exposure Not Asked    Hobby Hazards Not Asked    Sleep Concern Not Asked    Stress Concern Yes    Weight Concern No    Special Diet No    Back Care Not Asked    Exercise Yes    Bike Helmet Not Asked    Seat Belt Yes    Self-Exams Not Asked   Social History Narrative    Not on file     Social Drivers of Health     Financial Resource Strain: Not on file   Food Insecurity: Not on file   Transportation Needs: Not on file   Physical Activity: Not on file   Stress: Not on file   Social Connections: Not on file   Housing Stability: Not on file        Medications:   Current Outpatient Medications   Medication Sig Dispense Refill     varenicline 0.5 MG Oral Tab Take 1 tablet (0.5 mg total) by mouth 2 (two) times daily. 56 tablet 1    losartan 25 MG Oral Tab Take 1 tablet (25 mg total) by mouth daily. 90 tablet 2    ALPRAZolam 0.25 MG Oral Tab Take 1 tablet (0.25 mg total) by mouth 2 (two) times daily as needed. 30 tablet 0    albuterol 108 (90 Base) MCG/ACT Inhalation Aero Soln Inhale 2 puffs into the lungs every 6 (six) hours as needed for Wheezing. 3 each 2    budeson-glycopyrrol-formoterol (BREZTRI AEROSPHERE) 160-9-4.8 MCG/ACT Inhalation Aerosol Inhale 2 puffs into the lungs 2 (two) times daily. 10.7 g 5    Halobetasol Propionate 0.05 % External Ointment Apply 50 g topically 2 (two) times daily. Apply a thin layer to affected area(s). 150 g 3    azelastine 137 MCG/SPRAY Nasal Solution INSTILL 2 SPRAYS INTO EACH NOSTRIL TWICE DAILY OR AS NEEDED FOR NASAL SYMPTOMS.      montelukast 10 MG Oral Tab Take 1 tablet (10 mg total) by mouth daily.      metFORMIN  MG Oral Tablet 24 Hr Take 2 tablets (1,500 mg total) by mouth daily. 180 tablet 0    topiramate 25 MG Oral Tab Take 1 tablet (25 mg total) by mouth 2 (two) times daily. 180 tablet 0    SYMBICORT 160-4.5 MCG/ACT Inhalation Aerosol Inhale 2 puffs into the lungs 2 (two) times daily. 1 each 1    Clobetasol Propionate 0.05 % External Lotion APPLY TOPICALLY TO THE AFFECTED AREA TWICE DAILY 59 mL 3    valACYclovir 1 G Oral Tab Take 1 tablet (1,000 mg total) by mouth daily. 90 tablet 3    atorvastatin 20 MG Oral Tab Take 1 tablet (20 mg total) by mouth nightly. 90 tablet 3    aspirin 81 MG Oral Tab EC Take 1 tablet (81 mg total) by mouth daily. 30 tablet 5    Tadalafil 20 MG Oral Tab Take 1 tablet (20 mg total) by mouth daily as needed for Erectile Dysfunction. 30 tablet 5       Review of Systems: Review of Systems     Physical Exam:  /68 (BP Location: Right arm, Patient Position: Sitting, Cuff Size: adult)   Pulse 84   Resp 16   Ht 5' 10\" (1.778 m)   Wt 188 lb (85.3 kg)   SpO2 91%    BMI 26.98 kg/m²      Constitutional: alert, cooperative. No acute distress.  HEENT: Head NC/AT.   Cardio: Regular rate and rhythm. Normal S1 and S2. No murmurs.   Respiratory: Thorax symmetrical with no labored breathing. WHeezing to ausculation bilaterally with symmetrical breath sounds. No rhonchi, rales, or crackles.   Extremities: No clubbing or cyanosis. No BLE edema.    Neurologic: A&Ox3. No gross motor deficits.  Skin: Warm, dry  Psych: Calm, cooperative. Pleasant affect.    Results:  Personally reviewed    WBC: 10, done on 11/1/2024.  HGB: 14.7, done on 11/1/2024.  PLT: 210, done on 11/1/2024.     Glucose: 104, done on 1/8/2024.  Cr: 1.17, done on 1/8/2024.  Last eGFR was 76 on 1/8/2024.  CA: 9.6, done on 1/8/2024.  Na: 139, done on 1/8/2024.  K: 4.3, done on 1/8/2024.  Cl: 106, done on 1/8/2024.  CO2: 30, done on 1/8/2024.  Last ALB was 4.2% done on 1/8/2024.     No results found.     Assessment/Plan:  #1. Severe persistent asthma  Increased to Breztri with albuterol as needed  Check PFTs, will obtain Jan 2025  Asthma allergen profile, multiple allergens noted  Patient did have elevated eosinophils in the past  EOS 1310, IgE 761  Would be a good candidate for biologic, PA being sent for Fasenra      #2.  Tobacco abuse  30 pack years  Counseled extensively on tobacco cessation, we spent more than 10 minutes regarding tobacco cessation  Patient has tolerated Chantix in the past, restart, he understood significant risks associated with it and was okay with proceeding  Qualifies for Lung cancer screen. Plan for CT chest   Lung Cancer Counseling and Shared Decision Making Session in an Asymptomatic Smoker/Former Smoker   Amadoxiomara Saldana Jamier is a 49 year old male without current symptoms of lung cancer.  History   Smoking Status    Every Day    Types: Cigarettes   Smokeless Tobacco    Never        He received information on the importance of adherence to annual lung cancer Low Dose CT (LDCT) screening, the impact  of his comorbidities and his ability or willingness to undergo diagnosis and treatment. he is in agreement and an order will be placed for CT LUNG LD SCREENING(CPT=71271).    We counseled the importance of maintaining cigarette smoking abstinence if he is a former smoker and the importance of smoking cessation if he is a current smoker and we discussed and furnished information about tobacco cessation interventions.     Liza Jackson [facility]  Pulmonary Medicine   12/9/2024

## 2024-12-09 NOTE — PATIENT INSTRUCTIONS
Call central scheduling at [phone] to schedule the CT scan now for lung cancer screen  If you get ill (sinus infection, cold, respiratory illness or other illness) you should postpone the scan for at least 4-6 weeks after you have recovered. Please call with any questions.    Call office with any questions or concerns  Call office if develop any new or worsening respiratory symptoms.   Call office if your inhalers are more than $50 after co-pay  Continue the breztri inhaler - 2 puffs twice a day. Rinse your mouth out after using inhaler   Albuterol as needed for short of breath  Avoid allergen triggers  Continue with varenicline, decrease smoking

## 2025-01-04 ENCOUNTER — RT VISIT (OUTPATIENT)
Dept: RESPIRATORY THERAPY | Facility: HOSPITAL | Age: 50
End: 2025-01-04
Attending: INTERNAL MEDICINE
Payer: COMMERCIAL

## 2025-01-04 DIAGNOSIS — J45.50 SEVERE PERSISTENT ALLERGIC ASTHMA (HCC): ICD-10-CM

## 2025-01-04 PROCEDURE — 94726 PLETHYSMOGRAPHY LUNG VOLUMES: CPT

## 2025-01-04 PROCEDURE — 94060 EVALUATION OF WHEEZING: CPT

## 2025-01-04 PROCEDURE — 94729 DIFFUSING CAPACITY: CPT

## 2025-01-05 ENCOUNTER — HOSPITAL ENCOUNTER (OUTPATIENT)
Dept: CT IMAGING | Age: 50
Discharge: HOME | End: 2025-01-05
Payer: COMMERCIAL

## 2025-01-05 ENCOUNTER — HOSPITAL ENCOUNTER (OUTPATIENT)
Dept: CT IMAGING | Age: 50
End: 2025-01-05
Payer: COMMERCIAL

## 2025-01-05 DIAGNOSIS — F17.210 CIGARETTE SMOKER: ICD-10-CM

## 2025-01-05 PROCEDURE — 71271 CT THORAX LUNG CANCER SCR C-: CPT

## 2025-01-06 DIAGNOSIS — F17.210 SMOKING GREATER THAN 20 PACK YEARS: ICD-10-CM

## 2025-01-06 DIAGNOSIS — J44.9 CHRONIC OBSTRUCTIVE PULMONARY DISEASE, UNSPECIFIED COPD TYPE (HCC): Primary | ICD-10-CM

## 2025-01-07 NOTE — PROCEDURES
Patient is a 49-year-old male being assessed with a diagnosis of asthma.    Results    FEV1 2.22 L mildly reduced at -1.96  FVC 3.37 L normal at -1.09  Ratio is mildly reduced 66%  Following the administration of bronchodilators there was significant improvement in FEV1 to 2.49 L--for a 12% change.    Flow-volume loop with some notching that may be suggestive of MARY LOU-and evidence of an obstructive type pattern.    Total lung capacity 6.31 L normal at -0.04  Residual volume 2.59 L hyperinflated at 139% of predicted    Diffusion capacity is preserved -0.5 692% of predicted.  When corrected for alveolar volume it increases to 119% of predicted.    Impression ---mild reversible airways obstruction.  There is mild hyperinflation of the residual volume as can be seen with air trapping.  Diffusion capacity is preserved.    No prior tests were found  
Yes

## 2025-01-08 DIAGNOSIS — E78.49 OTHER HYPERLIPIDEMIA: ICD-10-CM

## 2025-01-08 RX ORDER — ATORVASTATIN CALCIUM 20 MG/1
20 TABLET, FILM COATED ORAL NIGHTLY
Qty: 90 TABLET | Refills: 3 | Status: SHIPPED | OUTPATIENT
Start: 2025-01-08

## 2025-01-08 NOTE — TELEPHONE ENCOUNTER
A refill request was received for:  Requested Prescriptions     Pending Prescriptions Disp Refills    ATORVASTATIN 20 MG Oral Tab [Pharmacy Med Name: ATORVASTATIN TABS 20MG] 90 tablet 3     Sig: TAKE 1 TABLET NIGHTLY       Last refill date:     10.23.2023      Last office visit: 121/26/2024    Follow up due:  Future Appointments   Date Time Provider Department Center   1/21/2025 11:00 AM Kayleen Beck PA-C [id number] [address]

## 2025-01-09 NOTE — TELEPHONE ENCOUNTER
Requesting   Requested Prescriptions     Pending Prescriptions Disp Refills    METFORMIN  MG Oral Tablet 24 Hr [Pharmacy Med Name: METFORMIN HCL ER TABS 750MG] 180 tablet 3     Sig: TAKE 2 TABLETS (1,500 MG) DAILY      LOV: 9/27/24  RTC: 3-6mo  Last Relevant Labs:   Filled: 9/27/24 #180 with 0 refills    Future Appointments   Date Time Provider Department Center   1/21/2025 11:00 AM Kayleen Beck PA-C [id number] [address]   1/24/2025 12:30 PM PF CT RM1 PF CT [place]

## 2025-01-10 RX ORDER — METFORMIN HYDROCHLORIDE 750 MG/1
TABLET, EXTENDED RELEASE ORAL
Qty: 180 TABLET | Refills: 3 | Status: SHIPPED | OUTPATIENT
Start: 2025-01-10

## 2025-01-21 ENCOUNTER — TELEMEDICINE (OUTPATIENT)
Facility: CLINIC | Age: 50
End: 2025-01-21
Payer: COMMERCIAL

## 2025-01-21 DIAGNOSIS — E78.5 DYSLIPIDEMIA: ICD-10-CM

## 2025-01-21 DIAGNOSIS — E88.810 METABOLIC SYNDROME: ICD-10-CM

## 2025-01-21 DIAGNOSIS — Z51.81 ENCOUNTER FOR THERAPEUTIC DRUG LEVEL MONITORING: Primary | ICD-10-CM

## 2025-01-21 DIAGNOSIS — I10 BENIGN ESSENTIAL HTN: ICD-10-CM

## 2025-01-21 DIAGNOSIS — E88.819 INSULIN RESISTANCE: ICD-10-CM

## 2025-01-21 DIAGNOSIS — E66.811 CLASS 1 OBESITY WITH SERIOUS COMORBIDITY AND BODY MASS INDEX (BMI) OF 30.0 TO 30.9 IN ADULT, UNSPECIFIED OBESITY TYPE: ICD-10-CM

## 2025-01-21 DIAGNOSIS — R73.03 PREDIABETES: ICD-10-CM

## 2025-01-21 DIAGNOSIS — G47.33 OSA ON CPAP: ICD-10-CM

## 2025-01-21 PROCEDURE — 98005 SYNCH AUDIO-VIDEO EST LOW 20: CPT | Performed by: PHYSICIAN ASSISTANT

## 2025-01-21 RX ORDER — TIRZEPATIDE 2.5 MG/.5ML
2.5 INJECTION, SOLUTION SUBCUTANEOUS WEEKLY
Qty: 2 ML | Refills: 0 | Status: SHIPPED | OUTPATIENT
Start: 2025-01-21

## 2025-01-21 NOTE — PROGRESS NOTES
This visit is conducted using Telemedicine with live, interactive video and audio.    Patient has been referred to the [facility] website at [email or web address] to review the yearly Consent to Treat document.    Patient understands and accepts financial responsibility for any deductible, co-insurance and/or co-pays associated with this service.      HISTORY OF PRESENT ILLNESS  Chief Complaint   Patient presents with    Weight Check       Amado Marc is a 49 year old male here for follow up in medical weight loss program.   190lbs  Pt is compliant on metformin, topamax  Denies chest pain, shortness of breath, dizziness, blurred vision, headache, paresthesia, nausea/vomiting.   Starting to gain some weight back  Does notice smaller portion size, but not as much as on zepbound  Decrease cravings for sweets  Knows not getting enough problems    Exercise/Activity: admits not great, schedule has been really busy  Nutrition: 24 hour food log reviewed, eating regular meals, +protein  Stress: higher with end of year and holidays, hopefully after next month that will settle down  Sleep: no problems    [facility] Follow Up    General Information  Nutrition Recall  Exercise     Sleep              Wt Readings from Last 6 Encounters:   12/09/24 188 lb (85.3 kg)   11/26/24 188 lb (85.3 kg)   11/01/24 185 lb (83.9 kg)   09/27/24 185 lb (83.9 kg)   02/02/24 207 lb (93.9 kg)   09/26/23 210 lb (95.3 kg)            Breakfast Lunch Dinner Snacks Fluids   Reviewed           REVIEW OF SYSTEMS  GENERAL HEALTH: feels well otherwise, denied any fevers chills or night sweats   RESPIRATORY: denies shortness of breath   CARDIOVASCULAR: denies chest pain  GI: denies abdominal pain    EXAM  There were no vitals taken for this visit.  GENERAL: well developed, well nourished,in no apparent distress, A/O x3  SKIN: no rashes,no suspicious lesions on visible skin  HEENT: atraumatic, normocephalic  LUNGS: no increased effort or work with breathing   NEURO:  speaking fluently and in clear sentences    Lab Results   Component Value Date    WBC 10.0 11/01/2024    RBC 4.14 (L) 11/01/2024    HGB 14.7 11/01/2024    HCT 41.2 11/01/2024    MCV 99.5 11/01/2024    MCH 35.5 (H) 11/01/2024    MCHC 35.7 11/01/2024    RDW 12.9 11/01/2024    .0 11/01/2024     Lab Results   Component Value Date     (H) 01/08/2024    BUN 12 01/08/2024    BUNCREA 15.2 12/21/2020    CREATSERUM 1.17 01/08/2024    ANIONGAP 3 01/08/2024    GFR 84 05/01/2017    GFRNAA 97 11/13/2021    GFRAA 113 11/13/2021    CA 9.6 01/08/2024    OSMOCALC 288 01/08/2024    ALKPHO 122 (H) 01/08/2024    AST 16 01/08/2024    ALT 30 01/08/2024    BILT 0.6 01/08/2024    TP 7.6 01/08/2024    ALB 4.2 01/08/2024    GLOBULIN 3.4 01/08/2024    AGRATIO 2.6 (H) 11/25/2015     01/08/2024    K 4.3 01/08/2024     01/08/2024    CO2 30.0 01/08/2024     Lab Results   Component Value Date     01/08/2024    A1C 5.5 11/26/2024     Lab Results   Component Value Date    CHOLEST 177 01/08/2024    TRIG 165 (H) 01/08/2024    HDL 50 01/08/2024    LDL 98 01/08/2024    VLDL 27 01/08/2024    TCHDLRATIO 5.79 (H) 05/14/2018    NONHDLC 127 01/08/2024     Lab Results   Component Value Date    T4F 0.8 01/08/2024    TSH 1.930 04/06/2024     Lab Results   Component Value Date    B12 370 01/08/2024    VITB12 317 07/26/2013     Lab Results   Component Value Date    VITD 21.8 (L) 11/07/2020       Medications Ordered Prior to Encounter[1]    ASSESSMENT  Analyzed weight data:       Diagnoses and all orders for this visit:    Encounter for therapeutic drug level monitoring  -     Tirzepatide-Weight Management (ZEPBOUND) 2.5 MG/0.5ML Subcutaneous Solution Auto-injector; Inject 2.5 mg into the skin once a week.    Class 1 obesity with serious comorbidity and body mass index (BMI) of 30.0 to 30.9 in adult, unspecified obesity type    MARY LOU on CPAP  -     Tirzepatide-Weight Management (ZEPBOUND) 2.5 MG/0.5ML Subcutaneous Solution  Auto-injector; Inject 2.5 mg into the skin once a week.    Prediabetes  -     Tirzepatide-Weight Management (ZEPBOUND) 2.5 MG/0.5ML Subcutaneous Solution Auto-injector; Inject 2.5 mg into the skin once a week.    Insulin resistance  -     Tirzepatide-Weight Management (ZEPBOUND) 2.5 MG/0.5ML Subcutaneous Solution Auto-injector; Inject 2.5 mg into the skin once a week.    Dyslipidemia  -     Tirzepatide-Weight Management (ZEPBOUND) 2.5 MG/0.5ML Subcutaneous Solution Auto-injector; Inject 2.5 mg into the skin once a week.    Benign essential HTN  -     Tirzepatide-Weight Management (ZEPBOUND) 2.5 MG/0.5ML Subcutaneous Solution Auto-injector; Inject 2.5 mg into the skin once a week.    Metabolic syndrome  -     Tirzepatide-Weight Management (ZEPBOUND) 2.5 MG/0.5ML Subcutaneous Solution Auto-injector; Inject 2.5 mg into the skin once a week.        PLAN  Initial Weight: 210lbs  Initial Weight Date: 9/26/23  Today's Weight: 190lbs  5% Goal: 10.5lbs  10% Goal: 21lbs  Total Weight Loss: +5lbs/Net loss 20lbs    Will continue metformin and topamax - advised side effects and adverse effects of medication   MARY LOU - CPAP compliance, Will begin Zepbound 2.5mg weekly - denies any personal or family history of pancreatitis, pancreatic cancer, thyroid cancer, MEN2 - discussed MOA, advised side effects and adverse effects of medication.  Prediabetes/Insulin resistance - continue current medications, low carb diet  HTN - blood pressure well controlled on current medication - advised signs and symptoms of hypotension and will monitor with continued weight loss  HLD - stable on current medication atorvastatin, will continue, will continue to work on lifestyle modifications  Consistency with logging foods - protein and produce  Incorporate more strength/resistance training  Nutrition: low carb diet/ recommended to eat breakfast daily/ regular protein intake  Medication use and side effects reviewed with patient.  Medication  contraindications: caution stimulant  Follow up with dietitian and psychologist as recommended.  Discussed the role of sleep and stress in weight management.  Counseled on comprehensive weight loss plan including attention to nutrition, exercise and behavior/stress management for success. See patient instruction below for more details.  Discussed strategies to overcome barriers to successful weight loss and weight maintenance  FITTE: ACSM recommendations (150-300 minutes/ week in active weight loss)   Weight Loss consent to treat reviewed and signed     There are no Patient Instructions on file for this visit.    No follow-ups on file.    Patient verbalizes understanding.    Kayleen Beck PA-C  1/21/2025    Please note that the following visit was completed using two-way, real-time interactive audio and video communication.  This has been done in good meseret to provide continuity of care in the best interest of the provider-patient relationship, due to the ongoing public health crisis/national emergency and because of restrictions of visitation.  There are limitations of this visit as no physical exam could be performed.  Every conscious effort was taken to allow for sufficient and adequate time.  This billing was spent on reviewing labs, medications, radiology tests and decision making.  Appropriate medical decision-making and tests are ordered as detailed in the plan of care above    Kayleen Beck PA-C           [1]   Current Outpatient Medications on File Prior to Visit   Medication Sig Dispense Refill    METFORMIN  MG Oral Tablet 24 Hr TAKE 2 TABLETS (1,500 MG) DAILY 180 tablet 3    ATORVASTATIN 20 MG Oral Tab TAKE 1 TABLET NIGHTLY 90 tablet 3    FASENRA PEN 30 MG/ML Subcutaneous Solution Auto-injector Inject 1 Pen into the skin every 28 days. For 3 doses 1 each 3    FASENRA PEN 30 MG/ML Subcutaneous Solution Auto-injector Inject 1 Pen into the skin Every 8 (eight) weeks for 28 days. 1 each 5     varenicline 0.5 MG Oral Tab Take 1 tablet (0.5 mg total) by mouth 2 (two) times daily. 56 tablet 1    losartan 25 MG Oral Tab Take 1 tablet (25 mg total) by mouth daily. 90 tablet 2    ALPRAZolam 0.25 MG Oral Tab Take 1 tablet (0.25 mg total) by mouth 2 (two) times daily as needed. 30 tablet 0    albuterol 108 (90 Base) MCG/ACT Inhalation Aero Soln Inhale 2 puffs into the lungs every 6 (six) hours as needed for Wheezing. 3 each 2    budeson-glycopyrrol-formoterol (BREZTRI AEROSPHERE) 160-9-4.8 MCG/ACT Inhalation Aerosol Inhale 2 puffs into the lungs 2 (two) times daily. 10.7 g 5    Halobetasol Propionate 0.05 % External Ointment Apply 50 g topically 2 (two) times daily. Apply a thin layer to affected area(s). 150 g 3    azelastine 137 MCG/SPRAY Nasal Solution INSTILL 2 SPRAYS INTO EACH NOSTRIL TWICE DAILY OR AS NEEDED FOR NASAL SYMPTOMS.      montelukast 10 MG Oral Tab Take 1 tablet (10 mg total) by mouth daily.      topiramate 25 MG Oral Tab Take 1 tablet (25 mg total) by mouth 2 (two) times daily. 180 tablet 0    SYMBICORT 160-4.5 MCG/ACT Inhalation Aerosol Inhale 2 puffs into the lungs 2 (two) times daily. 1 each 1    Clobetasol Propionate 0.05 % External Lotion APPLY TOPICALLY TO THE AFFECTED AREA TWICE DAILY 59 mL 3    valACYclovir 1 G Oral Tab Take 1 tablet (1,000 mg total) by mouth daily. 90 tablet 3    aspirin 81 MG Oral Tab EC Take 1 tablet (81 mg total) by mouth daily. 30 tablet 5    Tadalafil 20 MG Oral Tab Take 1 tablet (20 mg total) by mouth daily as needed for Erectile Dysfunction. 30 tablet 5     No current facility-administered medications on file prior to visit.

## 2025-01-24 ENCOUNTER — NURSE ONLY (OUTPATIENT)
Facility: CLINIC | Age: 50
End: 2025-01-24
Payer: COMMERCIAL

## 2025-01-24 ENCOUNTER — HOSPITAL ENCOUNTER (OUTPATIENT)
Dept: CT IMAGING | Age: 50
Discharge: HOME | End: 2025-01-24

## 2025-01-24 VITALS
SYSTOLIC BLOOD PRESSURE: 120 MMHG | HEART RATE: 80 BPM | OXYGEN SATURATION: 99 % | RESPIRATION RATE: 14 BRPM | DIASTOLIC BLOOD PRESSURE: 70 MMHG

## 2025-01-24 DIAGNOSIS — J45.50 SEVERE PERSISTENT ALLERGIC ASTHMA (HCC): Primary | ICD-10-CM

## 2025-01-24 DIAGNOSIS — F17.200 SMOKER: ICD-10-CM

## 2025-01-24 PROCEDURE — 3078F DIAST BP <80 MM HG: CPT | Performed by: INTERNAL MEDICINE

## 2025-01-24 PROCEDURE — 96372 THER/PROPH/DIAG INJ SC/IM: CPT | Performed by: INTERNAL MEDICINE

## 2025-01-24 PROCEDURE — 3074F SYST BP LT 130 MM HG: CPT | Performed by: INTERNAL MEDICINE

## 2025-01-24 RX ORDER — EPINEPHRINE 0.3 MG/.3ML
0.3 INJECTION SUBCUTANEOUS AS NEEDED
Qty: 1 EACH | Refills: 1 | Status: SHIPPED | OUTPATIENT
Start: 2025-01-24 | End: 2026-01-24

## 2025-01-24 NOTE — PROGRESS NOTES
Per Dr. Fish Patient to receive Fasenra 30 mg pen monthly x3.  Reviewed pen administration with patient and return demo given with  pen.  Shot given with no redness or swelling to injection site.  Reviewed signs and symptoms of an anaphylactic reaction and how to use EpiPen. demonstration given with  pen.  Patient to return in 1 month for 2nd shot.

## 2025-01-24 NOTE — PROGRESS NOTES
Date of Service 1/24/2025    NATALY LEYVAR  Date of Birth 4/26/1975    Patient Age: 49 year old    PCP: Rodger Fuentes MD  [address] [place] [address]  [address] [place] [id number]    Heart Scan Consult  Preliminary Heart Scan Score: 222    Previous Screening  Heart Scan Completed Previously: No        Peripheral Vascular Scan Completed Previously: No          Risk Factors  Personal Risk Factors  Non-alterable Risk Factors: Age;Gender;Family History (Reports, Mother has a history of stroke)    Alterable Risk Factors: Smoking;High Blood Pressure;Abnormal Cholesterol;Lack of exercise;Pre-Diabetes      Body Mass Index  There is no height or weight on file to calculate BMI.    Blood Pressure    (Normal =< 120/80,  Elevated = 120-129/ >80,  High Stage1 130-139/80-89 , Stage2 >140/>90)    Lipid Profile  Cholesterol: 177, done on 1/8/2024.  HDL Cholesterol: 50, done on 1/8/2024.  LDL Cholesterol: 98, done on 1/8/2024.  TriGlycerides 165, done on 1/8/2024.    Cholesterol Goals  Value   Total  =< 200   HDL  = > 45 Men = > 55 Women   LDL   =< 100   Triglycerides  =< 150       Glucose and Hemoglobin A1C  Lab Results   Component Value Date     (H) 01/08/2024    A1C 5.5 11/26/2024     (Normal Fasting Glucose < 100mg/dl )    Nurse Review  Risk factor information and results reviewed with Nurse: Yes    Recommended Follow Up:  Consult your physician regarding:: Final Heart Scan Report;Discuss Potential for Score Variance;Discuss potential for Incidental Finding;Exercise Program Clearance      Recommendations for Change:  Nutrition Changes: Low Saturated Fat;Low Fat Dairy;Low Salt Eating;Increase Fiber    Cholesterol Modification (goal of therapy depends upon your risk): Decrease Triglycerides (Ugly) Normal <150    Exercise: Initiate Program (Get exercise clearance from doctor prior to starting any exercise regimen)    Smoking Cessation: Ready to Quit Today              Repeat Heart Scan: Discuss with your Physician;3 Years if Calcium  Score is > 0.0              [place] Recommended Resources:  Recommended Resources: Upcoming Classes, Medical Services and Health Library [email or web address];PV Screening  Recommended PV Screening: Carotids (Flyer given for Stroke and PV Screening)  Other Resources:: Handouts given - Controlling Risk Factors, Hypertension, Diabetes ([id number] guide), Smoking, and Cholesterol      Arie PRINGLE, RN        Please Contact the Nurse Heart Line with any Questions or Concerns [phone].

## 2025-02-18 PROBLEM — Z80.0 FAMILY HISTORY OF COLON CANCER: Status: ACTIVE | Noted: 2025-02-18

## 2025-02-18 PROBLEM — Z86.0101 PERSONAL HISTORY OF ADENOMATOUS AND SERRATED COLON POLYPS: Status: ACTIVE | Noted: 2025-02-18

## 2025-02-21 ENCOUNTER — MED REC SCAN ONLY (OUTPATIENT)
Facility: CLINIC | Age: 50
End: 2025-02-21

## 2025-02-21 DIAGNOSIS — F41.9 ANXIETY: ICD-10-CM

## 2025-02-21 DIAGNOSIS — F17.210 CIGARETTE SMOKER: ICD-10-CM

## 2025-02-22 RX ORDER — ALPRAZOLAM 0.25 MG
0.25 TABLET ORAL 2 TIMES DAILY PRN
Qty: 30 TABLET | Refills: 0 | Status: SHIPPED | OUTPATIENT
Start: 2025-02-22

## 2025-02-22 NOTE — TELEPHONE ENCOUNTER
.A refill request was received for:  Requested Prescriptions     Pending Prescriptions Disp Refills    ALPRAZolam 0.25 MG Oral Tab 30 tablet 0     Sig: Take 1 tablet (0.25 mg total) by mouth 2 (two) times daily as needed.       Last refill date:   11/26/24    Last office visit: 11/26/24    Follow up due:  Future Appointments   Date Time Provider Department Center   3/3/2025 10:30 AM PULM BIOLOGICS [facility] [place]   3/3/2025 11:00 AM Oliverio Fish MD [id number] [place]

## 2025-02-24 RX ORDER — VARENICLINE TARTRATE 0.5 MG/1
0.5 TABLET, FILM COATED ORAL 2 TIMES DAILY
Qty: 56 TABLET | Refills: 1 | Status: SHIPPED | OUTPATIENT
Start: 2025-02-24

## 2025-02-24 RX ORDER — BUDESONIDE, GLYCOPYRROLATE, AND FORMOTEROL FUMARATE 160; 9; 4.8 UG/1; UG/1; UG/1
2 AEROSOL, METERED RESPIRATORY (INHALATION) 2 TIMES DAILY
Qty: 10.7 G | Refills: 1 | Status: SHIPPED | OUTPATIENT
Start: 2025-02-24

## 2025-02-24 NOTE — TELEPHONE ENCOUNTER
Received request for:  Breztri and Varenicline    Patient last seen by: GARRETT Magana on 12-9-24    Has scheduled appointment: 3-3-25    Physician recommendation:  Continue Breztri twice a day.  Continue with Varenicline and decrease smoking.  Follow up in 4 weeks.    Refill for Breztri sent to pharmacy.  Refill for Varenicline forwarded to Liza.

## 2025-03-03 ENCOUNTER — OFFICE VISIT (OUTPATIENT)
Facility: CLINIC | Age: 50
End: 2025-03-03
Payer: COMMERCIAL

## 2025-03-03 VITALS
SYSTOLIC BLOOD PRESSURE: 110 MMHG | HEART RATE: 82 BPM | RESPIRATION RATE: 14 BRPM | DIASTOLIC BLOOD PRESSURE: 70 MMHG | OXYGEN SATURATION: 96 %

## 2025-03-03 DIAGNOSIS — Z72.0 TOBACCO ABUSE: ICD-10-CM

## 2025-03-03 DIAGNOSIS — J45.50 SEVERE PERSISTENT ALLERGIC ASTHMA (HCC): Primary | ICD-10-CM

## 2025-03-03 PROCEDURE — 3074F SYST BP LT 130 MM HG: CPT | Performed by: INTERNAL MEDICINE

## 2025-03-03 PROCEDURE — 99214 OFFICE O/P EST MOD 30 MIN: CPT | Performed by: INTERNAL MEDICINE

## 2025-03-03 PROCEDURE — 3078F DIAST BP <80 MM HG: CPT | Performed by: INTERNAL MEDICINE

## 2025-03-03 NOTE — PROGRESS NOTES
[facility] Pulmonary Follow Up Note    Chief Complaint:  Chief Complaint   Patient presents with    Asthma     Here for follow up after 2nd Fasenra shot.  Patient took shot at home on the 25 th when he was coughing more.        History of Present Illness:  History of Present Illness  Amado Marc is a 49 year old male with long-standing asthma who presents for follow-up on asthma management.    He recently received his second dose of Fasenra, which has led to an improvement in his asthma symptoms. He experienced increased coughing when the first dose wore off, but after the second dose, his symptoms subsided. He feels better today and has not needed his rescue inhaler. No coughing in the morning or during the night.    He continues to use Breztri as an add-on therapy and confirms consistent use of his inhaler. He took his Breztri this morning and reports no need for a rescue inhaler today.    He has cut back on smoking to about half a pack a day, which is a reduction of about fifty percent. He acknowledges the struggle with quitting smoking and recognizes that a lot of it is mental.         Past Medical History:   Past Medical History:    ALCOHOL USE    Allergic rhinitis    Always had them since i was a kid.    Anxiety    Decorative tattoo    Depression    On and off for years.    Fatigue    Genital HSV    Hemorrhoids    High blood pressure    High cholesterol    Hyperlipidemia    Itch of skin    Obesity    Shortness of breath    Sleep apnea    Swveral years.    Stress    Unspecified essential hypertension    Weight gain    Wheezing        Past Surgical History:   Past Surgical History:   Procedure Laterality Date    Colonoscopy      Other surgical history  01/01/2014    anal fissure    Reinsert bi/triceps tendon,distal Left 04/11/2016    Procedure: BICEPS TENDON REPAIR;  Surgeon: Lombardi, John A, MD;  Location: [facility], [facility]    Vasectomy         Family Medical History:   Family History   Problem Relation  Age of Onset    Hypertension Father     High Cholesterol Father     Diabetes Father     Diabetes Mother     Colon Cancer Mother     High Blood Pressure Mother     Cancer Mother     Hypertension Mother     Stroke Mother     ADHD Daughter     Diabetes Paternal Grandmother     Diabetes Paternal Grandfather     Diabetes Sister         Social History:   Social History     Socioeconomic History    Marital status:      Spouse name: Not on file    Number of children: Not on file    Years of education: Not on file    Highest education level: Not on file   Occupational History    Not on file   Tobacco Use    Smoking status: Every Day     Current packs/day: 0.00     Average packs/day: 1 pack/day for 30.0 years (30.0 ttl pk-yrs)     Types: Cigarettes     Start date: 1990     Last attempt to quit: 2020     Years since quittin.2    Smokeless tobacco: Never   Substance and Sexual Activity    Alcohol use: Yes     Alcohol/week: 3.0 standard drinks of alcohol     Types: 3 Standard drinks or equivalent per week     Comment: social    Drug use: No    Sexual activity: Not on file   Other Topics Concern     Service Not Asked    Blood Transfusions Not Asked    Caffeine Concern No    Occupational Exposure Not Asked    Hobby Hazards Not Asked    Sleep Concern Not Asked    Stress Concern Yes    Weight Concern No    Special Diet No    Back Care Not Asked    Exercise Yes    Bike Helmet Not Asked    Seat Belt Yes    Self-Exams Not Asked   Social History Narrative    Not on file     Social Drivers of Health     Food Insecurity: Not on file   Transportation Needs: Not on file   Stress: Not on file   Housing Stability: Not on file        Medications:   Current Outpatient Medications   Medication Sig Dispense Refill    budeson-glycopyrrol-formoterol (BREZTRI AEROSPHERE) 160-9-4.8 MCG/ACT Inhalation Aerosol Inhale 2 puffs into the lungs 2 (two) times daily. 10.7 g 1    varenicline 0.5 MG Oral Tab Take 1 tablet (0.5 mg  total) by mouth 2 (two) times daily. 56 tablet 1    ALPRAZolam 0.25 MG Oral Tab Take 1 tablet (0.25 mg total) by mouth 2 (two) times daily as needed. 30 tablet 0    EPINEPHrine (EPIPEN 2-BETH) 0.3 MG/0.3ML Injection Solution Auto-injector Inject 0.3 mL (1 each total) as directed as needed. And must go to the ER if used. 1 each 1    METFORMIN  MG Oral Tablet 24 Hr TAKE 2 TABLETS (1,500 MG) DAILY 180 tablet 3    ATORVASTATIN 20 MG Oral Tab TAKE 1 TABLET NIGHTLY 90 tablet 3    FASENRA PEN 30 MG/ML Subcutaneous Solution Auto-injector Inject 1 Pen into the skin every 28 days. For 3 doses 1 each 3    losartan 25 MG Oral Tab Take 1 tablet (25 mg total) by mouth daily. 90 tablet 2    albuterol 108 (90 Base) MCG/ACT Inhalation Aero Soln Inhale 2 puffs into the lungs every 6 (six) hours as needed for Wheezing. 3 each 2    Halobetasol Propionate 0.05 % External Ointment Apply 50 g topically 2 (two) times daily. Apply a thin layer to affected area(s). 150 g 3    azelastine 137 MCG/SPRAY Nasal Solution       montelukast 10 MG Oral Tab Take 1 tablet (10 mg total) by mouth daily.      topiramate 25 MG Oral Tab Take 1 tablet (25 mg total) by mouth 2 (two) times daily. 180 tablet 0    Clobetasol Propionate 0.05 % External Lotion APPLY TOPICALLY TO THE AFFECTED AREA TWICE DAILY 59 mL 3    valACYclovir 1 G Oral Tab Take 1 tablet (1,000 mg total) by mouth daily. 90 tablet 3    aspirin 81 MG Oral Tab EC Take 1 tablet (81 mg total) by mouth daily. 30 tablet 5    Tadalafil 20 MG Oral Tab Take 1 tablet (20 mg total) by mouth daily as needed for Erectile Dysfunction. 30 tablet 5    Tirzepatide-Weight Management (ZEPBOUND) 2.5 MG/0.5ML Subcutaneous Solution Auto-injector Inject 2.5 mg into the skin once a week. (Patient not taking: Reported on 3/3/2025) 2 mL 0       Review of Systems: Review of Systems     Physical Exam:  /70 (BP Location: Right arm, Patient Position: Sitting, Cuff Size: large)   Pulse 82   Resp 14   SpO2 96%       Constitutional: alert, cooperative. No acute distress.  HEENT: Head NC/AT. Nares normal. Septum midline. Mucosa normal. No drainage or sinus tenderness.  Cardio: Regular rate and rhythm. Normal S1 and S2. No murmurs.   Respiratory: Thorax symmetrical with no labored breathing. clear to auscultation bilaterally  Extremities: No clubbing or cyanosis. No BLE edema.    Neurologic: A&Ox3. No gross motor deficits.  Skin: Warm, dry  Psych: Calm, cooperative. Pleasant affect.    Results:  Images personally reviewed - reading is my own personal review  Results           PFTs:       No data to display                   No data to display                    WBC: 10, done on 2024.  HGB: 14.7, done on 2024.  PLT: 210, done on 2024.     Last eGFR was 76 on 2024.  CA: 9.6, done on 2024.  Cl: 106, done on 2024.  CO2: 30, done on 2024.     CT CALCIUM SCORING    Result Date: 2025  PROCEDURE:  CT CALCIUM SCORING  COMPARISON:  None.  INDICATIONS:  F17.200 Smoker  TECHNIQUE:  The patient was placed in the supine position on the multidetector CT table and high-resolution non-contrast limited CT images of the heart, coronary arteries and proximal great vessels were obtained. Dose reduction techniques were used. Dose  information is transmitted to the ACR (American College of Radiology) NRDR (National Radiology Data Registry) which includes the Dose Index Registry. This cardiac scan was interpreted by a cardiologist with respect to the heart only. Please consult the radiology report for further interpretation  FINDINGS:   REGION  CALCIUM SCORE  LEFT MAIN:  0 LEFT ANTERIOR DESCENDIN CIRCUMFLEX:  55 RIGHT CORONARY ARTERY:    103  TOTAL CALCIUM SCORE:  222  Calcium Score  Implication   0  No identifiable calcified plaque   1-100  Mild atherosclerotic plaque   101-300  Moderate atherosclerotic plaque   301-999  Moderate-severe atherosclerotic plaque   >1000  Severe atherosclerotic plaque  1. J Am  Pietro Cardiol Img. 2022 Nov, 15 [phone]  Clinical Relevance of Coronary Artery Scan  This patient identified you as their physician, if this is not correct please contact the Nurse Heart[facility] at [phone] and they will assign this report to another physician.    Dictated by (CST): Ayan Shi MD on 1/24/2025 at 8:31 PM     Finalized by (CST): Ayan Shi MD on 1/24/2025 at 8:31 PM       CT CALCIUM SCORING OVER READ    Result Date: 1/24/2025  CONCLUSION:  1. There is a small sliding hiatal hernia noted. 2. No suspicious lung nodularity is seen within the field of view of the bilateral lungs. 3. Please also refer to previous CT lung screening exam from 1/5/2025. 4. Please also refer to the cardiologist's report.     [place]  [facility]   Dictated by (CST): Keith Sylvester MD on 1/24/2025 at 8:08 PM     Finalized by (CST): Keith Sylvester MD on 1/24/2025 at 8:12 PM       CT LUNG LD SCREENING(CPT=71271)    Result Date: 1/5/2025  CONCLUSION:  1. Lung-RADS Category  2:  Negative.  Benign findings with no evidence of primary lung cancer.  2. Lung-RADS Category S:    Negative.  3. Other incidental findings:  Mildly enlarged AP window mediastinal lymph node, nonspecific.  Clinical correlation and attention on follow-up recommended.  Coronary artery calcifications.  RECOMMENDATIONS:  LUNG-RADS 2: Continued routine annual LDCT lung screening.  Suggest next exam on or around 1/5/2026.    CT Lung Screening Reporting and Data System (Lung-RADS 1.1)    Lung Cancer Specific Category   ACR -Guidelines Based Follow-up   Category    Assessment                  Recommendation   0  Incomplete  Further imaging recommended    1  Negative  Routine annual LDCT screen (age <80)   2  Benign appearance or behavior  Routine annual LDCT screen (age <80)   3  Probably benign  Interval short-term diagnostic LDCT (6 months)   4a  Suspicious  Short-term follow-up LDCT or PET/CT (3 months)   4b  Suspicious  Multidisciplinary Conference  Review   4x  Suspicious  Category 3 or 4 nodules with other suspicious features   S  Other potentially significant findings  Follow-up based on abnormality  LungRAD scores of 3, 4A, and 4B will be reviewed in the Multidisciplinary Thoracic Oncology Clinic      LOCATION:  [place]    Dictated by (CST): John Wilde MD on 1/05/2025 at 2:02 PM     Finalized by (CST): John Wilde MD on 1/05/2025 at 2:06 PM         Assessment/Plan:  Assessment & Plan  Asthma  Improvement noted after the second dose of Fasenra. No need for rescue inhaler and reduced coughing at night. Continues on Breztri.  -Continue Fasenra and Breztri as prescribed.  -Next Fasenra shot due on 3/25/2025, and subsequent shot due on 5/25/2025.  -Follow-up appointment afterwards to assess response to treatment.    Tobacco Use  Reports cutting back to half a pack a day, but has not quit.  -Encouraged to quit smoking completely.  -Continue efforts to reduce and eventually quit smoking.    Follow-up  -Next appointment in late May or early June 2025.         Return in about 3 months (around 6/3/2025).    I spent 30 minutes obtaining and reviewing records, preparing for the visit including reviewing chart and prior testing, face to face time examining the patient and obtaining history, counseling, arranging and reviewing office-based testing, independently reviewing relevant studies and documentation exclusive of other billable procedures.      Oliverio Fish MD  3/3/2025

## 2025-03-24 DIAGNOSIS — J45.50 SEVERE PERSISTENT ALLERGIC ASTHMA (HCC): ICD-10-CM

## 2025-03-24 RX ORDER — BENRALIZUMAB 30 MG/ML
1 INJECTION, SOLUTION SUBCUTANEOUS
Qty: 1 EACH | Refills: 5 | Status: SHIPPED | OUTPATIENT
Start: 2025-03-24

## 2025-03-24 NOTE — TELEPHONE ENCOUNTER
Received request for: Fasenra    Patient last seen by: Dr. Fish on 03/03/25    Has scheduled appointment: 06/06/25    Physician recommendation: Continue Fasenra every 8 weeks.

## 2025-03-25 DIAGNOSIS — I10 BENIGN ESSENTIAL HTN: ICD-10-CM

## 2025-03-25 DIAGNOSIS — Z51.81 ENCOUNTER FOR THERAPEUTIC DRUG LEVEL MONITORING: ICD-10-CM

## 2025-03-25 DIAGNOSIS — E66.811 CLASS 1 OBESITY WITH SERIOUS COMORBIDITY AND BODY MASS INDEX (BMI) OF 30.0 TO 30.9 IN ADULT, UNSPECIFIED OBESITY TYPE: ICD-10-CM

## 2025-03-25 DIAGNOSIS — R73.03 PREDIABETES: ICD-10-CM

## 2025-03-25 DIAGNOSIS — E88.819 INSULIN RESISTANCE: ICD-10-CM

## 2025-03-25 DIAGNOSIS — G47.33 OSA ON CPAP: ICD-10-CM

## 2025-03-25 DIAGNOSIS — E88.810 METABOLIC SYNDROME: ICD-10-CM

## 2025-03-25 DIAGNOSIS — E78.5 DYSLIPIDEMIA: ICD-10-CM

## 2025-03-26 NOTE — TELEPHONE ENCOUNTER
Requesting   Requested Prescriptions     Pending Prescriptions Disp Refills    TOPIRAMATE 25 MG Oral Tab [Pharmacy Med Name: TOPIRAMATE TABS 25MG] 180 tablet 3     Sig: TAKE 1 TABLET TWICE A DAY      LOV: 1/25/25 (tele)  RTC: 3-6mo  Last Relevant Labs:   Filled: 09/27/24 #180 with 0 refills    Future Appointments   Date Time Provider Department Center   4/21/2025  9:15 AM Arya Guajardo MD [facility]   6/6/2025 10:30 AM Oliverio Fish MD [id number] [place]

## 2025-03-28 RX ORDER — TOPIRAMATE 25 MG/1
25 TABLET, FILM COATED ORAL 2 TIMES DAILY
Qty: 180 TABLET | Refills: 3 | Status: SHIPPED | OUTPATIENT
Start: 2025-03-28

## 2025-04-17 DIAGNOSIS — F41.9 ANXIETY: ICD-10-CM

## 2025-04-17 DIAGNOSIS — R06.2 WHEEZING: ICD-10-CM

## 2025-04-17 RX ORDER — ALBUTEROL SULFATE 90 UG/1
2 INHALANT RESPIRATORY (INHALATION) EVERY 6 HOURS PRN
Qty: 3 EACH | Refills: 2 | Status: SHIPPED | OUTPATIENT
Start: 2025-04-17

## 2025-04-17 NOTE — TELEPHONE ENCOUNTER
A refill request was received for:  Requested Prescriptions     Pending Prescriptions Disp Refills    ALPRAZolam 0.25 MG Oral Tab 30 tablet 0     Sig: Take 1 tablet (0.25 mg total) by mouth 2 (two) times daily as needed.       Last refill date:   2-22-25    Last office visit: 11-26-24    Follow up due:  Future Appointments   Date Time Provider Department Center   4/21/2025  9:15 AM Arya Guajardo MD [facility]   6/6/2025 10:30 AM Oliverio Fish MD [facility] [id number] [facility] [place]

## 2025-04-18 RX ORDER — ALPRAZOLAM 0.25 MG
0.25 TABLET ORAL 2 TIMES DAILY PRN
Qty: 30 TABLET | Refills: 0 | Status: SHIPPED | OUTPATIENT
Start: 2025-04-18

## 2025-05-21 RX ORDER — BUDESONIDE, GLYCOPYRROLATE, AND FORMOTEROL FUMARATE 160; 9; 4.8 UG/1; UG/1; UG/1
2 AEROSOL, METERED RESPIRATORY (INHALATION) 2 TIMES DAILY
Qty: 10.7 G | Refills: 5 | Status: SHIPPED | OUTPATIENT
Start: 2025-05-21

## 2025-05-21 NOTE — TELEPHONE ENCOUNTER
Received request for:  Breztri    Patient last seen by: Dr. Fish on 3-3-25    Has scheduled appointment: 6-6-25    Physician recommendation: -Continue Fasenra and Breztri as prescribed.  Next appointment in late May or early June 2025.     Refill for Breztri sent to pharmacy.

## 2025-05-23 ENCOUNTER — OFFICE VISIT (OUTPATIENT)
Dept: FAMILY MEDICINE CLINIC | Facility: CLINIC | Age: 50
End: 2025-05-23
Payer: COMMERCIAL

## 2025-05-23 VITALS
BODY MASS INDEX: 26.6 KG/M2 | OXYGEN SATURATION: 96 % | RESPIRATION RATE: 18 BRPM | WEIGHT: 190 LBS | HEIGHT: 71 IN | SYSTOLIC BLOOD PRESSURE: 118 MMHG | DIASTOLIC BLOOD PRESSURE: 80 MMHG | HEART RATE: 79 BPM

## 2025-05-23 DIAGNOSIS — Z71.6 ENCOUNTER FOR SMOKING CESSATION COUNSELING: ICD-10-CM

## 2025-05-23 DIAGNOSIS — R73.09 ELEVATED GLUCOSE: ICD-10-CM

## 2025-05-23 DIAGNOSIS — Z00.00 ANNUAL PHYSICAL EXAM: Primary | ICD-10-CM

## 2025-05-23 DIAGNOSIS — E55.9 VITAMIN D DEFICIENCY: ICD-10-CM

## 2025-05-23 DIAGNOSIS — Z23 NEED FOR SHINGLES VACCINE: ICD-10-CM

## 2025-05-23 PROCEDURE — 3079F DIAST BP 80-89 MM HG: CPT | Performed by: FAMILY MEDICINE

## 2025-05-23 PROCEDURE — 99406 BEHAV CHNG SMOKING 3-10 MIN: CPT | Performed by: FAMILY MEDICINE

## 2025-05-23 PROCEDURE — 90471 IMMUNIZATION ADMIN: CPT | Performed by: FAMILY MEDICINE

## 2025-05-23 PROCEDURE — 90750 HZV VACC RECOMBINANT IM: CPT | Performed by: FAMILY MEDICINE

## 2025-05-23 PROCEDURE — 99396 PREV VISIT EST AGE 40-64: CPT | Performed by: FAMILY MEDICINE

## 2025-05-23 PROCEDURE — 3008F BODY MASS INDEX DOCD: CPT | Performed by: FAMILY MEDICINE

## 2025-05-23 PROCEDURE — 3074F SYST BP LT 130 MM HG: CPT | Performed by: FAMILY MEDICINE

## 2025-05-23 RX ORDER — VARENICLINE TARTRATE 1 MG/1
1 TABLET, FILM COATED ORAL 2 TIMES DAILY
Qty: 180 TABLET | Refills: 1 | Status: SHIPPED | OUTPATIENT
Start: 2025-05-23

## 2025-05-23 NOTE — H&P
The 21st Century Cures Act makes medical notes like these available to patients in the interest of transparency. Please be advised this is a medical document. Medical documents are intended to carry relevant information, facts as evident, and the clinical opinion of the practitioner. The medical note is intended as peer to peer communication and may appear blunt or direct. It is written in medical language and may contain abbreviations or verbiage that are unfamiliar.   Amado Marc is a 50 year old male who presents for a complete physical exam.     HPI:   Pt complains of short breathing and wheezing takes zyrtec and singulair and seeing to pulmonologist.  DMII.  Chronic.  Last A1c value was 5.5% done 11/26/2024., fasting 130      + sleep apnea, using C-PAP machine.    + cigarettes smoking 1/2 packet/day      Colonoscopy: 02/18/2025    Current Medications[1]   Past Medical History[2]   Past Surgical History[3]   Family History[4]   Social History:  Short Social Hx on File[5]      REVIEW OF SYSTEMS:   GENERAL: feels well otherwise  SKIN: denies any unusual skin lesions or rash  EYES:denies blurred vision or double vision  HEENT: denies nasal congestion, sinus pain or ST, denies decreased hearing  LUNGS:  shortness of breath and wheezing   CV: denies chest pain, pressure or palpitations  GI: denies abdominal pain, heartburn, chronic diarrhea or constipation  : denies nocturia or changes in stream, denies erectile dysfunction  MS: denies back pain, myalgias or arthralgias  NEURO: denies headaches or dizziness  PSYCH: denies depression or anxiety  HEMATOLOGIC: denies bruising or bleeding easily  ENDOCRINE: denies frequent thirst or urination, denies unintentional weight gain/loss    EXAM:   /80   Pulse 79   Resp 18   Ht 5' 11\" (1.803 m)   Wt 190 lb   SpO2 96%   BMI 26.50 kg/m²       Body mass index is 26.5 kg/m².     GENERAL: well developed, well nourished,in no apparent distress  SKIN: no rashes,no  suspicious lesions  HEENT: atraumatic, normocephalic,TMs clear, posterior pharynx clear  EYES: PERRLA,conjunctiva clear  NECK: supple,no thyromegaly, no adenopathy  LUNGS: + wheeze, + lung tightness  CV: S1 S2 noted, RRR, no murmur  GI: active bowel sounds, no rebound/rigidity/tenderness, no HSM  : 2 descended testes, no scrotal mass or tenderness, normal penis no lesions, no hernia  MS: Marika, good tone & strength  EXT: no cyanosis, clubbing or edema  NEURO: Alert & oriented x 3, LEs +2DTRs  PSYCH: Mood and affect appropriate    ASSESSMENT AND PLAN:   Amado Ayalar is a 50 year old male who presents for a complete physical exam. Heart healthy diet, routine exercise, and annual flu vaccines encouraged.  The patient indicates understanding of these issues and agrees to the plan.  - Advised to reduce and stop cigarette smoking, use nicotine patches.     1. Annual physical exam  - Lipid Panel; Future  - CBC With Differential With Platelet; Future  - PSA Total, Screen; Future  - Comp Metabolic Panel (14); Future  - TSH W Reflex To Free T4; Future    2. Need for shingles vaccine  - Zoster Recombinant Adjuvanted (Shingrix -Shingles) [[id number]]; Future  - Zoster Recombinant Adjuvanted (Shingrix -Shingles) [[id number]]    3. Vitamin D deficiency  - Vitamin D [E]; Future    4. Elevated glucose  - Hemoglobin A1C; Future    5. Encounter for smoking cessation counseling  - varenicline 1 MG Oral Tab; Take 1 tablet (1 mg total) by mouth 2 (two) times daily.  Dispense: 180 tablet; Refill: 1    Orders Placed This Encounter   Procedures    Lipid Panel     Standing Status:   Future     Expected Date:   5/23/2025     Expiration Date:   5/23/2026    CBC With Differential With Platelet     Standing Status:   Future     Expected Date:   5/23/2025     Expiration Date:   5/23/2026    PSA Total, Screen     Standing Status:   Future     Expected Date:   5/23/2025     Expiration Date:   5/23/2026    Comp Metabolic Panel (14)     Standing Status:    Future     Expected Date:   5/23/2025     Expiration Date:   5/23/2026    TSH W Reflex To Free T4     Standing Status:   Future     Expected Date:   5/23/2025     Expiration Date:   5/23/2026    Vitamin D [E]     Standing Status:   Future     Expected Date:   5/23/2025     Expiration Date:   5/23/2026     Please pick the scenario that best fits the purpose for ordering this test:   General Screening/Vit D deficiency (25-Hydroxy)     Release to patient:   Immediate    Hemoglobin A1C     Standing Status:   Future     Expected Date:   5/23/2025     Expiration Date:   5/23/2026    Zoster Recombinant Adjuvanted (Shingrix -Shingles) [[id number]]     Standing Status:   Future     Expected Date:   8/23/2025     Expiration Date:   5/23/2026    Zoster Recombinant Adjuvanted (Shingrix -Shingles) [[id number]]     Follow up in 1 year.         [1]   Current Outpatient Medications   Medication Sig Dispense Refill    varenicline 1 MG Oral Tab Take 1 tablet (1 mg total) by mouth 2 (two) times daily. 180 tablet 1    budeson-glycopyrrol-formoterol (BREZTRI AEROSPHERE) 160-9-4.8 MCG/ACT Inhalation Aerosol INHALE 2 PUFFS INTO THE LUNGS TWICE DAILY 10.7 g 5    ALPRAZolam 0.25 MG Oral Tab Take 1 tablet (0.25 mg total) by mouth 2 (two) times daily as needed. 30 tablet 0    albuterol 108 (90 Base) MCG/ACT Inhalation Aero Soln Inhale 2 puffs into the lungs every 6 (six) hours as needed for Wheezing. 3 each 2    TOPIRAMATE 25 MG Oral Tab TAKE 1 TABLET TWICE A  tablet 3    FASENRA PEN 30 MG/ML Subcutaneous Solution Auto-injector Inject 1 Pen into the skin Every 8 (eight) weeks. 1 each 5    EPINEPHrine (EPIPEN 2-BETH) 0.3 MG/0.3ML Injection Solution Auto-injector Inject 0.3 mL (1 each total) as directed as needed. And must go to the ER if used. 1 each 1    METFORMIN  MG Oral Tablet 24 Hr TAKE 2 TABLETS (1,500 MG) DAILY 180 tablet 3    ATORVASTATIN 20 MG Oral Tab TAKE 1 TABLET NIGHTLY 90 tablet 3    losartan 25 MG Oral Tab Take 1 tablet (25 mg  total) by mouth daily. 90 tablet 2    Halobetasol Propionate 0.05 % External Ointment Apply 50 g topically 2 (two) times daily. Apply a thin layer to affected area(s). 150 g 3    azelastine 137 MCG/SPRAY Nasal Solution       montelukast 10 MG Oral Tab Take 1 tablet (10 mg total) by mouth daily.      Clobetasol Propionate 0.05 % External Lotion APPLY TOPICALLY TO THE AFFECTED AREA TWICE DAILY 59 mL 3    valACYclovir 1 G Oral Tab Take 1 tablet (1,000 mg total) by mouth daily. 90 tablet 3    aspirin 81 MG Oral Tab EC Take 1 tablet (81 mg total) by mouth daily. 30 tablet 5    Tadalafil 20 MG Oral Tab Take 1 tablet (20 mg total) by mouth daily as needed for Erectile Dysfunction. 30 tablet 5   [2]   Past Medical History:   ALCOHOL USE    Allergic rhinitis    Always had them since i was a kid.    Anxiety    Decorative tattoo    Depression    On and off for years.    Fatigue    Genital HSV    Hemorrhoids    High blood pressure    High cholesterol    Hyperlipidemia    Itch of skin    Obesity    Shortness of breath    Sleep apnea    Swveral years.    Stress    Unspecified essential hypertension    Weight gain    Wheezing   [3]   Past Surgical History:  Procedure Laterality Date    Colonoscopy      Other surgical history  01/01/2014    anal fissure    Reinsert bi/triceps tendon,distal Left 04/11/2016    Procedure: BICEPS TENDON REPAIR;  Surgeon: Lombardi, John A, MD;  Location: [facility], [facility]    Vasectomy     [4]   Family History  Problem Relation Age of Onset    Hypertension Father     High Cholesterol Father     Diabetes Father     Diabetes Mother     Colon Cancer Mother     High Blood Pressure Mother     Cancer Mother     Hypertension Mother     Stroke Mother     ADHD Daughter     Diabetes Paternal Grandmother     Diabetes Paternal Grandfather     Diabetes Sister    [5]   Social History  Socioeconomic History    Marital status:    Tobacco Use    Smoking status: Every Day     Current packs/day: 0.00      Average packs/day: 1 pack/day for 30.0 years (30.0 ttl pk-yrs)     Types: Cigarettes     Start date: 1990     Last attempt to quit: 2020     Years since quittin.4    Smokeless tobacco: Never   Vaping Use    Vaping status: Never Used   Substance and Sexual Activity    Alcohol use: Yes     Alcohol/week: 3.0 standard drinks of alcohol     Types: 3 Standard drinks or equivalent per week     Comment: social    Drug use: No   Other Topics Concern    Caffeine Concern No    Stress Concern Yes    Weight Concern No    Special Diet No    Exercise Yes    Seat Belt Yes

## 2025-06-16 ENCOUNTER — OFFICE VISIT (OUTPATIENT)
Facility: CLINIC | Age: 50
End: 2025-06-16
Payer: COMMERCIAL

## 2025-06-16 VITALS
HEART RATE: 79 BPM | BODY MASS INDEX: 26.6 KG/M2 | OXYGEN SATURATION: 97 % | WEIGHT: 190 LBS | SYSTOLIC BLOOD PRESSURE: 132 MMHG | HEIGHT: 71 IN | DIASTOLIC BLOOD PRESSURE: 90 MMHG | RESPIRATION RATE: 16 BRPM

## 2025-06-16 DIAGNOSIS — J45.51 SEVERE PERSISTENT ASTHMA WITH ACUTE EXACERBATION (HCC): Primary | ICD-10-CM

## 2025-06-16 PROCEDURE — 3080F DIAST BP >= 90 MM HG: CPT | Performed by: INTERNAL MEDICINE

## 2025-06-16 PROCEDURE — 99214 OFFICE O/P EST MOD 30 MIN: CPT | Performed by: INTERNAL MEDICINE

## 2025-06-16 PROCEDURE — 3008F BODY MASS INDEX DOCD: CPT | Performed by: INTERNAL MEDICINE

## 2025-06-16 PROCEDURE — 3075F SYST BP GE 130 - 139MM HG: CPT | Performed by: INTERNAL MEDICINE

## 2025-06-16 RX ORDER — ALBUTEROL SULFATE AND BUDESONIDE 90; 80 UG/1; UG/1
2 AEROSOL, METERED RESPIRATORY (INHALATION) EVERY 6 HOURS PRN
Qty: 10.7 G | Refills: 10 | Status: SHIPPED | OUTPATIENT
Start: 2025-06-16

## 2025-06-16 NOTE — PROGRESS NOTES
The following individual(s) verbally consented to be recorded using ambient AI listening technology and understand that they can each withdraw their consent to this listening technology at any point by asking the clinician to turn off or pause the recording:    Patient name: Amado Les Marc  Additional names:

## 2025-06-16 NOTE — PROGRESS NOTES
[facility] Pulmonary Follow Up Note    Chief Complaint:  Chief Complaint   Patient presents with    Follow - Up     Pt here for f/up.  Pt states continued itchiness and trouble breathing.        History of Present Illness:  History of Present Illness  Amado Marc is a 50 year old male with asthma who presents with persistent hives and breathing difficulties.    He is experiencing persistent hives and swelling, primarily on his forearms, neck, and around his eyes, causing significant discomfort due to constant itching. The hives have not improved despite current medication use.    He is also experiencing respiratory symptoms, including coughing and difficulty breathing. His current asthma medications, including Breztri and Fasenra, have not been effective. Initially, he experienced some improvement in breathing with Fasenra during the first month, but symptoms have since worsened. He has received four to five doses of Fasenra, administered every other month, with the last dose on May 23, 2025. He continues to use Breztri but finds it only partially effective.    His eosinophil count was noted to be 1300 during an allergy screening.    He has recently started taking Chantix to aid in smoking cessation. Initially, he was on a low dose, but this was increased about one to two weeks ago. No issues with the increased dose.         Past Medical History:   Past Medical History[1]     Past Surgical History:   Past Surgical History[2]    Family Medical History: Family History[3]     Social History:   Social History     Socioeconomic History    Marital status:      Spouse name: Not on file    Number of children: Not on file    Years of education: Not on file    Highest education level: Not on file   Occupational History    Not on file   Tobacco Use    Smoking status: Every Day     Current packs/day: 0.00     Average packs/day: 1 pack/day for 30.0 years (30.0 ttl pk-yrs)     Types: Cigarettes     Start date: 12/20/1990      Last attempt to quit: 2020     Years since quittin.4    Smokeless tobacco: Never   Vaping Use    Vaping status: Never Used   Substance and Sexual Activity    Alcohol use: Yes     Alcohol/week: 3.0 standard drinks of alcohol     Types: 3 Standard drinks or equivalent per week     Comment: social    Drug use: No    Sexual activity: Not on file   Other Topics Concern     Service Not Asked    Blood Transfusions Not Asked    Caffeine Concern No    Occupational Exposure Not Asked    Hobby Hazards Not Asked    Sleep Concern Not Asked    Stress Concern Yes    Weight Concern No    Special Diet No    Back Care Not Asked    Exercise Yes    Bike Helmet Not Asked    Seat Belt Yes    Self-Exams Not Asked   Social History Narrative    Not on file     Social Drivers of Health     Food Insecurity: Not on file   Transportation Needs: Not on file   Stress: Not on file   Housing Stability: Not on file        Medications: Current Medications[4]    Review of Systems: Review of Systems     Physical Exam:  /90   Pulse 79   Resp 16   Ht 5' 11\" (1.803 m)   Wt 190 lb (86.2 kg)   SpO2 97%   BMI 26.50 kg/m²      Constitutional: alert, cooperative. No acute distress.  HEENT: Head NC/AT. Nares normal. Septum midline. Mucosa normal. No drainage or sinus tenderness.  Cardio: Regular rate and rhythm. Normal S1 and S2. No murmurs.   Respiratory: Thorax symmetrical with no labored breathing. wheezes bilaterally  Extremities: No clubbing or cyanosis. No BLE edema.    Neurologic: A&Ox3. No gross motor deficits.  Skin: mild rashes noted onforearms, medial thigh, forehead, and upper back  Psych: Calm, cooperative. Pleasant affect.    Results:  Images personally reviewed - reading is my own personal review  Results  LABS  Eosinophils: 1300 cells/µL       PFTs:       No data to display                   No data to display                    WBC: 10, done on 2024.  HGB: 14.7, done on 2024.  PLT: 210, done on 2024.      Last eGFR was 76 on 1/8/2024.  CA: 9.6, done on 1/8/2024.  Cl: 106, done on 1/8/2024.  CO2: 30, done on 1/8/2024.     No results found.     Assessment/Plan:  Assessment & Plan  Severe persistent asthma, uncontrolled  Asthma symptoms persist despite Breztri and Fasenra. Considering Dupixent due to elevated eosinophils and skin involvement. He agreed to biweekly administration.  - Switch from Fasenra to Dupixent biweekly.  - Continue Breztri.  - Prescribe airsupra as a rescue inhaler.    Allergic reaction with skin involvement  Persistent urticaria and pruritus possibly related to elevated eosinophils. Current treatment ineffective. Dupixent considered for skin involvement.  - Switch from Fasenra to Dupixent to address skin involvement.    Tobacco use  Using Chantix for smoking cessation, recently increased dose without issues.  - Continue Chantix at the higher dose.         Return in about 6 weeks (around 7/28/2025).    I spent 30 minutes obtaining and reviewing records, preparing for the visit including reviewing chart and prior testing, face to face time examining the patient and obtaining history, counseling, arranging and reviewing office-based testing, independently reviewing relevant studies and documentation exclusive of other billable procedures.      Oliverio Fish MD  6/16/2025         [1]   Past Medical History:   ALCOHOL USE    Allergic rhinitis    Always had them since i was a kid.    Anxiety    Decorative tattoo    Depression    On and off for years.    Fatigue    Genital HSV    Hemorrhoids    High blood pressure    High cholesterol    Hyperlipidemia    Itch of skin    Obesity    Shortness of breath    Sleep apnea    Swveral years.    Stress    Unspecified essential hypertension    Weight gain    Wheezing   [2]   Past Surgical History:  Procedure Laterality Date    Colonoscopy      Other surgical history  01/01/2014    anal fissure    Reinsert bi/triceps tendon,distal Left 04/11/2016     Procedure: BICEPS TENDON REPAIR;  Surgeon: Lombardi, John A, MD;  [facility]    Vasectomy     [3]   Family History  Problem Relation Age of Onset    Hypertension Father     High Cholesterol Father     Diabetes Father     Diabetes Mother     Colon Cancer Mother     High Blood Pressure Mother     Cancer Mother     Hypertension Mother     Stroke Mother     ADHD Daughter     Diabetes Paternal Grandmother     Diabetes Paternal Grandfather     Diabetes Sister    [4]   Current Outpatient Medications   Medication Sig Dispense Refill    Albuterol-Budesonide (AIRSUPRA) 90-80 MCG/ACT Inhalation Aerosol Inhale 2 puffs into the lungs every 6 (six) hours as needed. 10.7 g 10    varenicline 1 MG Oral Tab Take 1 tablet (1 mg total) by mouth 2 (two) times daily. 180 tablet 1    budeson-glycopyrrol-formoterol (BREZTRI AEROSPHERE) 160-9-4.8 MCG/ACT Inhalation Aerosol INHALE 2 PUFFS INTO THE LUNGS TWICE DAILY 10.7 g 5    ALPRAZolam 0.25 MG Oral Tab Take 1 tablet (0.25 mg total) by mouth 2 (two) times daily as needed. 30 tablet 0    albuterol 108 (90 Base) MCG/ACT Inhalation Aero Soln Inhale 2 puffs into the lungs every 6 (six) hours as needed for Wheezing. 3 each 2    TOPIRAMATE 25 MG Oral Tab TAKE 1 TABLET TWICE A  tablet 3    FASENRA PEN 30 MG/ML Subcutaneous Solution Auto-injector Inject 1 Pen into the skin Every 8 (eight) weeks. 1 each 5    EPINEPHrine (EPIPEN 2-BETH) 0.3 MG/0.3ML Injection Solution Auto-injector Inject 0.3 mL (1 each total) as directed as needed. And must go to the ER if used. 1 each 1    METFORMIN  MG Oral Tablet 24 Hr TAKE 2 TABLETS (1,500 MG) DAILY 180 tablet 3    ATORVASTATIN 20 MG Oral Tab TAKE 1 TABLET NIGHTLY 90 tablet 3    losartan 25 MG Oral Tab Take 1 tablet (25 mg total) by mouth daily. 90 tablet 2    Halobetasol Propionate 0.05 % External Ointment Apply 50 g topically 2 (two) times daily. Apply a thin layer to affected area(s). 150 g 3    azelastine 137 MCG/SPRAY  Nasal Solution       montelukast 10 MG Oral Tab Take 1 tablet (10 mg total) by mouth daily.      Clobetasol Propionate 0.05 % External Lotion APPLY TOPICALLY TO THE AFFECTED AREA TWICE DAILY 59 mL 3    valACYclovir 1 G Oral Tab Take 1 tablet (1,000 mg total) by mouth daily. 90 tablet 3    aspirin 81 MG Oral Tab EC Take 1 tablet (81 mg total) by mouth daily. 30 tablet 5    Tadalafil 20 MG Oral Tab Take 1 tablet (20 mg total) by mouth daily as needed for Erectile Dysfunction. 30 tablet 5

## 2025-06-19 ENCOUNTER — TELEPHONE (OUTPATIENT)
Facility: CLINIC | Age: 50
End: 2025-06-19

## 2025-06-19 DIAGNOSIS — J45.51 SEVERE PERSISTENT ASTHMA WITH ACUTE EXACERBATION (HCC): Primary | ICD-10-CM

## 2025-06-19 RX ORDER — DUPILUMAB 300 MG/2ML
1 INJECTION, SOLUTION SUBCUTANEOUS EVERY 2 WEEKS
Qty: 4 ML | Refills: 5 | Status: SHIPPED | OUTPATIENT
Start: 2025-06-19

## 2025-06-19 RX ORDER — DUPILUMAB 300 MG/2ML
2 INJECTION, SOLUTION SUBCUTANEOUS ONCE
Qty: 4 ML | Refills: 0 | Status: SHIPPED | OUTPATIENT
Start: 2025-06-19 | End: 2025-06-19

## 2025-06-19 NOTE — TELEPHONE ENCOUNTER
Per Dr. Fish patient to start Dupixent 300 mg subcutaneous every 2 weeks.  Prior authorization obtained from [facility].  Approval # [id number] Valid from 05/20/25-12/16/2025.   Order sent to [facility] and message left for patient.

## 2025-06-20 NOTE — TELEPHONE ENCOUNTER
My chart message sent to patient informing of approval of Dupixent.  Patient asked to make an appointment once he receives his medication.

## 2025-08-05 ENCOUNTER — MED REC SCAN ONLY (OUTPATIENT)
Facility: CLINIC | Age: 50
End: 2025-08-05

## (undated) DIAGNOSIS — G47.33 OSA (OBSTRUCTIVE SLEEP APNEA): Primary | ICD-10-CM

## (undated) DIAGNOSIS — F41.9 ANXIETY: ICD-10-CM

## (undated) DIAGNOSIS — G47.33 OBSTRUCTIVE SLEEP APNEA (ADULT) (PEDIATRIC): Primary | ICD-10-CM

## (undated) NOTE — LETTER
07/16/20        Lavelle Singh [place] [id number]      Dear Almita Phelps records indicate that you have outstanding lab work and or testing that was ordered for you and has not yet been completed:  Orders Placed This Encounter      Enrique Gallegos

## (undated) NOTE — MR AVS SNAPSHOT
[address]  [address] [id number] [phone]               Thank you for choosing us for your health care visit with Tyler Bae MD.  We are glad to serve you and happy to provide you with this Dust Mites     Mold                 Today's Vital Signs     BP Pulse Temp Height Weight BMI    126/80 mmHg 94 98.5 °F (36.9 °C) (Temporal) 70\" 213 lb 30.56 kg/m2         Current Medications          This list is accurate as of: 5/1/17 12:11 PM.  Always u

## (undated) NOTE — MR AVS SNAPSHOT
After Visit Summary   5/4/2017    Emy Cruz [facility]    MRN: [id number]           Visit Information        Provider Department Dept Phone    5/4/2017 10:00 PM Bed1  Sleep Clinic [phone]      Allergies as of 5/4/2017  Reviewed on: 5/1/2017    [place] Enter your Zip Code and Date of Birth (mm/dd/yyyy) as indicated and click Next. You will be taken to the next sign-up page. Create a Support Your Appt Username.  This will be your Support Your Appt login Username and cannot be changed, so think of one that is secure and eas

## (undated) NOTE — MR AVS SNAPSHOT
[address]  [address] [facility], [address] [phone]               Thank you for choosing us for your health care visit with Sumaya Iglesias DO.   We are glad to serve you and happy to provide you with this andres BP Pulse Height Weight BMI    118/78 mmHg 96 70\" 218 lb 31.28 kg/m2         Current Medications          This list is accurate as of: 4/28/17  3:35 PM.  Always use your most recent med list.                ALPRAZolam 0.25 MG Tabs   Take 1 tablet (0.25 mg

## (undated) NOTE — MR AVS SNAPSHOT
[address]  [address] [phone]               Thank you for choosing us for your health care visit with Eveline Ibarra DO.   We are glad to serve you and happy to provide you with this andres Order:  Derm - Internal    Arnaldo Owens MD   [address] [place] [address] [facility] [address]   Phone:  [phone]   Fax:  [address] [facility], [address] [place] [address]   [place] [id number]   Phone:  [phone]   Fax: office. At that time, you will be provided with any authorization numbers or be assured that none are required. You can then schedule your appointment. Failure to obtain required authorization numbers can create reimbursement difficulties for you.     [facility] [address], [facility] [id number]    Hours:  24-hours Phone:  [phone]    - BuPROPion HCl ER (XL) 150 MG Tb24  - Clobetasol Propionate 0.05 % Lotn  - Losartan Potassium 50 MG Tabs      You can get these medications from any pharmacy     Bring a kimmie Don’t forget strength training with weights and resistance Set goals and track your progress   You don’t need to join a gym. Home exercises work great.  Put more priority on exercise in your life                    Visit [facility] online at

## (undated) NOTE — MR AVS SNAPSHOT
After Visit Summary   6/15/2017    Regina Eason Monico    MRN: [id number]           Visit Information        Provider Department Dept Phone    6/15/2017 10:00 PM Bed1  [facility] Clinic [phone]      Allergies as of 6/15/2017  Reviewed on: 5/5/2017    Temo Expires: 8/15/2017  8:33 AM      Enter your Zip Code and Date of Birth (mm/dd/yyyy) as indicated and click Next. You will be taken to the next sign-up page. Create a Peopleclick Authoriat Username.  This will be your [employer] login Username and cannot be changed, so th

## (undated) NOTE — LETTER
04/07/21        [facility]  [address] [place] [address]      Dear [place] Marquis,    [address] [facility] records indicate that you have outstanding lab work and or testing that was ordered for you and has not yet been completed:  Orders Placed This Encounter      C